# Patient Record
Sex: MALE | Race: WHITE | Employment: OTHER | URBAN - METROPOLITAN AREA
[De-identification: names, ages, dates, MRNs, and addresses within clinical notes are randomized per-mention and may not be internally consistent; named-entity substitution may affect disease eponyms.]

---

## 2018-04-01 ENCOUNTER — HOSPITAL ENCOUNTER (EMERGENCY)
Facility: CLINIC | Age: 83
Discharge: HOME | End: 2018-04-01
Attending: EMERGENCY MEDICINE | Admitting: EMERGENCY MEDICINE
Payer: MEDICARE

## 2018-04-01 ENCOUNTER — APPOINTMENT (OUTPATIENT)
Dept: CT IMAGING | Facility: CLINIC | Age: 83
End: 2018-04-01
Attending: PHYSICIAN ASSISTANT
Payer: MEDICARE

## 2018-04-01 ENCOUNTER — APPOINTMENT (OUTPATIENT)
Dept: GENERAL RADIOLOGY | Facility: CLINIC | Age: 83
End: 2018-04-01
Attending: PHYSICIAN ASSISTANT
Payer: MEDICARE

## 2018-04-01 VITALS
SYSTOLIC BLOOD PRESSURE: 181 MMHG | TEMPERATURE: 97.8 F | RESPIRATION RATE: 18 BRPM | DIASTOLIC BLOOD PRESSURE: 158 MMHG | OXYGEN SATURATION: 99 %

## 2018-04-01 DIAGNOSIS — M54.6 ACUTE MIDLINE THORACIC BACK PAIN: ICD-10-CM

## 2018-04-01 DIAGNOSIS — M53.3 PAIN IN THE COCCYX: ICD-10-CM

## 2018-04-01 DIAGNOSIS — S09.90XA CLOSED HEAD INJURY, INITIAL ENCOUNTER: ICD-10-CM

## 2018-04-01 DIAGNOSIS — R03.0 ELEVATED BLOOD PRESSURE READING WITHOUT DIAGNOSIS OF HYPERTENSION: ICD-10-CM

## 2018-04-01 PROCEDURE — 72125 CT NECK SPINE W/O DYE: CPT

## 2018-04-01 PROCEDURE — 99284 EMERGENCY DEPT VISIT MOD MDM: CPT | Mod: 25

## 2018-04-01 PROCEDURE — 70450 CT HEAD/BRAIN W/O DYE: CPT

## 2018-04-01 PROCEDURE — 72220 X-RAY EXAM SACRUM TAILBONE: CPT

## 2018-04-01 PROCEDURE — 25000132 ZZH RX MED GY IP 250 OP 250 PS 637: Performed by: PHYSICIAN ASSISTANT

## 2018-04-01 PROCEDURE — 72072 X-RAY EXAM THORAC SPINE 3VWS: CPT

## 2018-04-01 PROCEDURE — 72100 X-RAY EXAM L-S SPINE 2/3 VWS: CPT

## 2018-04-01 RX ORDER — ACETAMINOPHEN 325 MG/1
650 TABLET ORAL ONCE
Status: COMPLETED | OUTPATIENT
Start: 2018-04-01 | End: 2018-04-01

## 2018-04-01 RX ADMIN — ACETAMINOPHEN 650 MG: 325 TABLET, FILM COATED ORAL at 16:44

## 2018-04-01 ASSESSMENT — ENCOUNTER SYMPTOMS
BACK PAIN: 1
NECK PAIN: 1
WOUND: 0
VOMITING: 0

## 2018-04-01 NOTE — ED AVS SNAPSHOT
[facility] Emergency Department    [address]    [facility] [address]    Phone:  [phone]    Fax:  [phone]                                       Terry Whalen   MRN: [id number]    Department:  [facility] Emergency Department   Date of Visit:  4/1/2018           After Visit Summary Signature Page     I have received my discharge instructions, and my questions have been answered. I have discussed any challenges I see with this plan with the nurse or doctor.    ..........................................................................................................................................  Patient/Patient Representative Signature      ..........................................................................................................................................  Patient Representative Print Name and Relationship to Patient    ..................................................               ................................................  Date                                            Time    ..........................................................................................................................................  Reviewed by Signature/Title    ...................................................              ..............................................  Date                                                            Time

## 2018-04-01 NOTE — ED PROVIDER NOTES
History     Chief Complaint:  Fall and Back Pain    HPI   Terry Whalen is a 86 year old male who presents to the emergency department today via EMS for evaluation of a fall and back pain. The patient reports prior to arrival he was attempting to  his dog on the floor when he accidentally lost his balance and fell backwards hitting his coccyx and then his head on the wall and then landed on his low back. He was unable to get up secondary to pain and leg weakness, EMS was called and he presented to the ED for further evaluation. Upon EMS presentation, he was up and walking around and complained of neck pain therefore placed in a c-collar. However, patient notes his neck pain is secondary to hitting his head while ago.  He currently rates his pain 2/10. The patient denies loss of consciousness, vomiting, and he remembers the whole event. Of note, patient recently moved to [place] from [place] in September 2017.     Allergies:  No Known Drug Allergies     Medications:    Aspirin (two baby aspirin a day)  Acetaminophen 325mg (three times a day)    Past Medical History:    History reviewed. No pertinent past medical history.    Past Surgical History:    History reviewed. No pertinent past surgical history.    Family History:    History reviewed. No pertinent family history.     Social History:  The patient was accompanied to the ED by family.  Marital Status:      Review of Systems   Gastrointestinal: Negative for vomiting.   Musculoskeletal: Positive for back pain and neck pain.   Skin: Negative for wound.   Neurological: Negative for syncope.   All other systems reviewed and are negative.    Physical Exam   Vitals:  BP: 184/87  Heart Rate: 75  Temp: 97.8  F (36.6  C)  Resp: 18  SpO2: 98 %    Physical Exam  General: Alert and interactive. Appears well.   Eyes: The pupils are equal and round. EOMs intact. No scleral icterus. No erythema of the posterior oropharynx.      CV: Regular rate and rhythm.  S1 and S2 normal without murmur, click, gallop or rub.   Resp: Breath sounds are clear bilaterally, without rhonchi, wheezes, rales. Non-labored, no retractions or accessory muscle use.     GI: Abdomen is soft without distension. No tenderness to palpation. No peritoneal signs.    MS: Tenderness to palpation along the midline spine, especially over the cervical vertebra prominens. Patient is along tender along midline spine in thoracic and lumbar spine.   Skin:  Warm and dry. No rash or lesions noted.  Neuro: Alert and oriented x 3. GCS 15. No focal deficits noted. Good sensation and strength in upper and lower extremities.   Psych: Awake. Alert.  Normal affect. Appropriate interactions.  Lymph: No anterior or posterior cervical lymphadenopathy noted.    Emergency Department Course     Imaging:  Radiology findings were communicated with the patient who voiced understanding of the findings.  Thoracic Spine XR 3 Views   IMPRESSION: Multilevel degenerative change.  Report per radiology     Lumbar Spine XR 2-3 Views  IMPRESSION:   1. Multilevel degenerative changes.  2. No fractures are identified.  Report per radiology     XR Sacrum and Coccyx 2 Views  IMPRESSION: No definite sacral fracture. No definite coccygeal  fracture or dislocation.  Report per radiology     Head CT w/o Contrast  No bleed or fractures are identified.  Report per radiology     Cervical Spine CT w/o Contrast  1. Multilevel degenerative changes. Reversal of the cervical lordosis.  2. No fractures are identified.  Report per radiology     Interventions:  1644 Tylenol 650mg PO     Emergency Department Course:  Nursing notes and vitals reviewed.  The patient was sent for a multiple imaging studies while in the emergency department, results above.   1550: I performed an exam of the patient as documented above.   1735: Patient rechecked and updated.   Findings and plan explained to the Patient and family. Patient discharged home with instructions  regarding supportive care, medications, and reasons to return. The importance of close follow-up was reviewed.   I personally reviewed the imaging results with the Patient and family and answered all related questions prior to discharge.    Impression & Plan    Medical Decision Making: Terry Whalen is a 86 year old male who presents for evaluation of back pain after a fall. He has some symptoms of back pain after hitting his head after falling when trying to  his dog. He dies loss of consciousness, and he has no other symptoms that would suggest a head injury. On exam, the patient is neurologically intact. He has some midline spinal tenderness, most prominently in the low cervical and sacral spine. Patient has completely negative imaging workup, including negative CT of head and neck, and negative x-rays of the remainder of his spine. It is likely that he had a contusion of his spine. The patient only takes Tylenol for pain, as he has a poor reaction to Ibuprofen and refuses anything stronger for pain. At this point, I feel he is safe for discharge. He should follow up closely with his PCP. Additionally, the patient had elevated blood pressure today, and he may need to bring this up to his PCP. He may return to the Emergency Department for any worsening pain. He has no further questions nor concerns.     Diagnosis:    ICD-10-CM    1. Closed head injury, initial encounter S09.90XA    2. Acute midline thoracic back pain M54.6    3. Pain in the coccyx M53.3      Disposition:  Discharged to home    Discharge Medications:    Chloe RODRIGUEZ PA-C, interviewed the patient, explained the course of action and discussed the patient with Dr. Valera, who then evaluated the patient.    Scribe Disclosure:  I, Agustina Batista, am serving as a scribe at 3:42 PM on 4/1/2018 to document services personally performed by Chloe Valverde PA-C based on my observations and the provider's statements to me.     4/1/2018   [facility] EMERGENCY DEPARTMENT       Chloe Valverde PA-C  04/01/18 [id number]

## 2018-04-01 NOTE — ED NOTES
Bed: ED20  Expected date: 4/1/18  Expected time: 3:23 PM  Means of arrival: Ambulance  Comments:  Caro Falcon

## 2018-04-01 NOTE — ED AVS SNAPSHOT
[facility] Emergency Department    [address]    [facility] [address]    Phone:  [phone]    Fax:  [phone]                                       Terry Whalen   MRN: [id number]    Department:  [facility] Emergency Department   Date of Visit:  4/1/2018           Patient Information     Date Of Birth          2/23/1932        Your diagnoses for this visit were:     Closed head injury, initial encounter     Acute midline thoracic back pain        You were seen by Pascual Valera MD.      Follow-up Information     Follow up with [facility] In 2 days.    Why:  As needed    Contact information:    [address]  [facility] [address]  [phone]          Follow up with [facility] Emergency Department.    Specialty:  EMERGENCY MEDICINE    Why:  If symptoms worsen    Contact information:    [address]  [facility] [id number] [phone]        Discharge Instructions       Discharge Instructions  Head Injury    You have been seen today for a head injury. Your evaluation included a history and physical examination. You may have had a CT (CAT) scan performed, though most head injuries do not require a scan. Based on this evaluation, your provider today does not feel that your head injury is serious.    Generally, every Emergency Department visit should have a follow-up clinic visit with either a primary or a specialty clinic/provider. Please follow-up as instructed by your emergency provider today.  Return to the Emergency Department if:    You are confused or you are not acting right.    Your headache gets worse or you start to have a really bad headache even with your recommended treatment plan.    You vomit (throw up) more than once.    You have a seizure.    You have trouble walking.    You have weakness or paralysis (cannot move) in an arm or a leg.    You have blood or fluid coming from your ears or  nose.    You have new symptoms or anything that worries you.    Sleeping:  It is okay for you to sleep, but someone should wake you up if instructed by your provider, and someone should check on you at your usual time to wake up.     Activity:    Do not drive for at least 24 hours.    Do not drive if you have dizzy spells or trouble concentrating, or remembering things.    Do not return to any contact sports until cleared by your regular provider.     MORE INFORMATION:    Concussion:  A concussion is a minor head injury that may cause temporary problems with the way the brain works. Although concussions are important, they are generally not an emergency or a reason that a person needs to be hospitalized. Some concussion symptoms include confusion, amnesia (forgetful), nausea (sick to your stomach) and vomiting (throwing up), dizziness, fatigue, memory or concentration problems, irritability and sleep problems. For most people, concussions are mild and temporary but some will have more severe and persistent symptoms that require on-going care and treatment.  CT Scans: Your evaluation today may have included a CT scan (CAT scan) to look for things like bleeding or a skull fracture (broken bone).  CT scans involve radiation and too many CT scans can cause serious health problems like cancer, especially in children.  Because of this, your provider may not have ordered a CT scan today if they think you are at low risk for a serious or life threatening problem.    If you were given a prescription for medicine here today, be sure to read all of the information (including the package insert) that comes with your prescription.  This will include important information about the medicine, its side effects, and any warnings that you need to know about.  The pharmacist who fills the prescription can provide more information and answer questions you may have about the medicine.  If you have questions or concerns that the  pharmacist cannot address, please call or return to the Emergency Department.     Remember that you can always come back to the Emergency Department if you are not able to see your regular provider in the amount of time listed above, if you get any new symptoms, or if there is anything that worries you.  Discharge Instructions  Back Pain  You were seen today for back pain. Back pain can have many causes, but most will get better without surgery or other specific treatment. Sometimes there is a herniated ( slipped ) disc. We do not usually do MRI scans to look for these right away, since most herniated discs will get better on their own with time.  Today, we did not find any evidence that your back pain was caused by a serious condition. However, sometimes symptoms develop over time and cannot be found during an emergency visit, so it is very important that you follow up with your primary provider.  Generally, every Emergency Department visit should have a follow-up clinic visit with either a primary or a specialty clinic/provider. Please follow-up as instructed by your emergency provider today.    Return to the Emergency Department if:    You develop a fever with your back pain.     You have weakness or change in sensation in one or both legs.    You lose control of your bowels or bladder, or cannot empty your bladder (cannot pee).    Your pain gets much worse.     Follow-up with your provider:    Unless your pain has completely gone away, please make an appointment with your provider within one week. Most of the routine care for back pain is available in a clinic and not the Emergency Department. You may need further management of your back pain, such as more pain medication, imaging such as an X-ray or MRI, or physical therapy.    What can I do to help myself?    Remain Active -- People are often afraid that they will hurt their back further or delay recovery by remaining active, but this is one of the best things  you can do for your back. In fact, staying in bed for a long time to rest is not recommended. Studies have shown that people with low back pain recover faster when they remain active. Movement helps to bring blood flow to the muscles and relieve muscle spasms as well as preventing loss of muscle strength.    Heat -- Using a heating pad can help with low back pain during the first few weeks. Do not sleep with a heating pad, as you can be burned.     Pain medications - You may take a pain medication such as Tylenol  (acetaminophen), Advil , Motrin  (ibuprofen) or Aleve  (naproxen).  If you were given a prescription for medicine here today, be sure to read all of the information (including the package insert) that comes with your prescription.  This will include important information about the medicine, its side effects, and any warnings that you need to know about.  The pharmacist who fills the prescription can provide more information and answer questions you may have about the medicine.  If you have questions or concerns that the pharmacist cannot address, please call or return to the Emergency Department.   Remember that you can always come back to the Emergency Department if you are not able to see your regular provider in the amount of time listed above, if you get any new symptoms, or if there is anything that worries you.      24 Hour Appointment Hotline       To make an appointment at any [facility], call [phone]). If you don't have a family doctor or clinic, we will help you find one. [facility] are conveniently located to serve the needs of you and your family.             Review of your medicines      Our records show that you are taking the medicines listed below. If these are incorrect, please call your family doctor or clinic.        Dose / Directions Last dose taken    ASPIRIN PO        Take by mouth daily   Refills:  0        TYLENOL PO   Dose:  650 mg        Take 650  mg by mouth Takes three times daily   Refills:  0                Procedures and tests performed during your visit     Cervical spine CT w/o contrast    Head CT w/o contrast    Lumbar spine XR, 2-3 views    Thoracic spine XR, 3 views    XR Sacrum and Coccyx 2 Views      Orders Needing Specimen Collection     None      Pending Results     Date and Time Order Name Status Description    4/1/2018 1601 XR Sacrum and Coccyx 2 Views Preliminary     4/1/2018 1601 Lumbar spine XR, 2-3 views Preliminary     4/1/2018 1601 Thoracic spine XR, 3 views Preliminary     4/1/2018 1601 Cervical spine CT w/o contrast Preliminary     4/1/2018 1601 Head CT w/o contrast Preliminary             Pending Culture Results     No orders found from 3/30/2018 to 4/2/2018.            Pending Results Instructions     If you had any lab results that were not finalized at the time of your Discharge, you can call the ED Lab Result RN at [phone]. You will be contacted by this team for any positive Lab results or changes in treatment. The nurses are available 7 days a week from 10A to 6:30P.  You can leave a message 24 hours per day and they will return your call.        Test Results From Your Hospital Stay        4/1/2018  5:09 PM      Narrative     CT SCAN OF THE HEAD WITHOUT CONTRAST   4/1/2018 5:03 PM     HISTORY: Fall, hit head;     TECHNIQUE:  Axial images of the head and coronal reformations without  IV contrast material. Radiation dose for this scan was reduced using  automated exposure control, adjustment of the mA and/or kV according  to patient size, or iterative reconstruction technique.    COMPARISON: None.    FINDINGS:  There is generalized atrophy of the brain.  White matter  changes are present in the cerebral hemispheres that are consistent  with small vessel ischemic disease in this age patient. There is no  evidence of intracranial hemorrhage, mass, acute infarct or anomaly.  The visualized portions of the sinuses and mastoids  appear normal. No  intracranial hemorrhage or skull fractures.        Impression     IMPRESSION: No bleed or fractures are identified.           4/1/2018  5:08 PM      Narrative     CT CERVICAL SPINE WITHOUT CONTRAST   4/1/2018 5:04 PM     HISTORY: Fall, midline spinal pain, Fall, midline spinal pain;      TECHNIQUE: Axial images of the cervical spine were obtained without  intravenous contrast. Multiplanar reformations were performed.   Radiation dose for this scan was reduced using automated exposure  control, adjustment of the mA and/or kV according to patient size, or  iterative reconstruction technique.    COMPARISON: None.    FINDINGS: There is no evidence of fracture. There is severe reversal  of the cervical lordosis.  There is scoliosis of the cervical spine  convex towards the right.    Craniocervical junction: Normal.     C1-C2:  Calcified pannus is seen posterior to the odontoid. This can  be seen in patients with calcium pyrophosphate deposition disease.     C2-C3:  Mild annular disc bulge. Severe degenerative changes in the  left facet joint.     C3-C4:  Loss of disc space height. Mild annular bulge. Severe  degenerative changes in the facet joints.     C4-C5:  Loss of disc space height. Severe degenerative change in the  left facet joint.     C5-C6:  Mild annular disc bulge. Degenerative changes in the facet  joints.      C6-C7:  Loss of disc space height. Mild bulge.      C7-T1:   Loss of disc space height. Central canal and neural foramen  are patent.        Impression     IMPRESSION:    1. Multilevel degenerative changes. Reversal of the cervical lordosis.  2. No fractures are identified.         4/1/2018  5:33 PM      Narrative     THORACIC SPINE THREE VIEWS 4/1/2018 5:25 PM     HISTORY: Fall, midline spinal tenderness;     COMPARISON: None.    FINDINGS: There is scoliosis of the thoracic spine convex towards the  right and lumbar spine convex towards the left. Loss of disc space  height at  multiple levels. No acute fractures are identified..        Impression     IMPRESSION: Multilevel degenerative change.         4/1/2018  5:32 PM      Narrative     LUMBAR SPINE TWO-THREE  VIEWS  4/1/2018 5:15 PM     HISTORY: Fall, midline spinal pain;     COMPARISON: None.    FINDINGS: There is scoliosis of the lumbar spine convex towards the  left. There is loss of disc space height throughout the lumbar spine.  No fractures are identified..        Impression     IMPRESSION:   1. Multilevel degenerative changes.  2. No fractures are identified.         4/1/2018  5:32 PM      Narrative     SACRUM AND COCCYX TWO VIEWS  4/1/2018 5:20 PM    HISTORY: Fall, midline tenderness;     COMPARISON: None.        Impression     IMPRESSION: No definite sacral fracture. No definite coccygeal  fracture or dislocation.                Clinical Quality Measure: Blood Pressure Screening     Your blood pressure was checked while you were in the emergency department today. The last reading we obtained was  BP: (!) 181/158 . Please read the guidelines below about what these numbers mean and what you should do about them.  If your systolic blood pressure (the top number) is less than 120 and your diastolic blood pressure (the bottom number) is less than 80, then your blood pressure is normal. There is nothing more that you need to do about it.  If your systolic blood pressure (the top number) is 120-139 or your diastolic blood pressure (the bottom number) is 80-89, your blood pressure may be higher than it should be. You should have your blood pressure rechecked within a year by a primary care provider.  If your systolic blood pressure (the top number) is 140 or greater or your diastolic blood pressure (the bottom number) is 90 or greater, you may have high blood pressure. High blood pressure is treatable, but if left untreated over time it can put you at risk for heart attack, stroke, or kidney failure. You should have your blood pressure  "rechecked by a primary care provider within the next 4 weeks.  If your provider in the emergency department today gave you specific instructions to follow-up with your doctor or provider even sooner than that, you should follow that instruction and not wait for up to 4 weeks for your follow-up visit.        Thank you for choosing [facility]       Thank you for choosing [facility] for your care. Our goal is always to provide you with excellent care. Hearing back from our patients is one way we can continue to improve our services. Please take a few minutes to complete the written survey that you may receive in the mail after you visit with us. Thank you!        [facility] Information     [employer] lets you send messages to your doctor, view your test results, renew your prescriptions, schedule appointments and more. To sign up, go to [email or web address] . Click on \"Log in\" on the left side of the screen, which will take you to the Welcome page. Then click on \"Sign up Now\" on the right side of the page.     You will be asked to enter the access code listed below, as well as some personal information. Please follow the directions to create your username and password.     Your access code is: [email or web address]  Expires: 2018  5:36 PM     Your access code will  in 90 days. If you need help or a new code, please call your [facility] clinic or [phone].        Care EveryWhere ID     This is your Care EveryWhere ID. This could be used by other organizations to access your [facility] medical records  [id number]        Equal Access to Services     CARLI MAGAÑA : Hadii shannen ku hadasho Soomaali, waaxda luqadaha, qaybta kaalmada adeegyada, tania frias . So [facility] [phone].    ATENCIÓN: Si habla español, tiene a jane disposición servicios gratuitos de asistencia lingüística. Llame al [phone].    We comply with applicable federal civil rights laws and [place] laws. We do not discriminate on " the basis of race, color, national origin, age, disability, sex, sexual orientation, or gender identity.            After Visit Summary       This is your record. Keep this with you and show to your community pharmacist(s) and doctor(s) at your next visit.

## 2018-04-01 NOTE — ED PROVIDER NOTES
Emergency Department Attending Supervision Note  4/1/2018  5:42 PM      I evaluated this patient in conjunction with Chloe Valverde PA-C      Briefly, the patient is an 86 year old male who is here after a ground level fall in which he did bump his head on a wall and landed on his back side. There is no loss of consciousness. He is complaining of some mild neck and back discomfort. He is not anticoagulated. There is no external sign of trauma and he had negative imaging including CT head, c-spine, and T and L spine. He is being discharged with reassurance for minor back strain and close head injury.     General: Patient is alert and cooperative.  HENT:  No external sign of head trauma   Eyes: EOMI. Normal conjunctiva.  Neck:  Normal range of motion and appearance.   Cardiovascular:  Normal rate, regular rhythm.  Pulmonary/Chest:  Effort normal. No wheezing or crackles.  Abdominal: Soft. No distension or tenderness.     Musculoskeletal: Normal range of motion. No edema or tenderness.   Neurological: oriented, normal strength, sensation, and coordination.   Skin: Warm and dry. No rash or bruising.   Psychiatric: Normal mood and affect. Normal behavior and judgement.        Diagnosis    ICD-10-CM    1. Closed head injury, initial encounter S09.90XA    2. Acute midline thoracic back pain M54.6    3. Pain in the coccyx M53.3          Pascual Valera MD Isaacson, Brian A, MD  04/05/18 1641       Pascual Valera MD  04/05/18 1641

## 2018-04-01 NOTE — ED NOTES
Pt states he was attempting to  his dog from the floor and fell back. Injury to coccyx, and hit back of head. Denies LOC. C-collar on, rates pain 2/10.

## 2018-04-01 NOTE — DISCHARGE INSTRUCTIONS
Discharge Instructions  Head Injury    You have been seen today for a head injury. Your evaluation included a history and physical examination. You may have had a CT (CAT) scan performed, though most head injuries do not require a scan. Based on this evaluation, your provider today does not feel that your head injury is serious.    Generally, every Emergency Department visit should have a follow-up clinic visit with either a primary or a specialty clinic/provider. Please follow-up as instructed by your emergency provider today.  Return to the Emergency Department if:    You are confused or you are not acting right.    Your headache gets worse or you start to have a really bad headache even with your recommended treatment plan.    You vomit (throw up) more than once.    You have a seizure.    You have trouble walking.    You have weakness or paralysis (cannot move) in an arm or a leg.    You have blood or fluid coming from your ears or nose.    You have new symptoms or anything that worries you.    Sleeping:  It is okay for you to sleep, but someone should wake you up if instructed by your provider, and someone should check on you at your usual time to wake up.     Activity:    Do not drive for at least 24 hours.    Do not drive if you have dizzy spells or trouble concentrating, or remembering things.    Do not return to any contact sports until cleared by your regular provider.     MORE INFORMATION:    Concussion:  A concussion is a minor head injury that may cause temporary problems with the way the brain works. Although concussions are important, they are generally not an emergency or a reason that a person needs to be hospitalized. Some concussion symptoms include confusion, amnesia (forgetful), nausea (sick to your stomach) and vomiting (throwing up), dizziness, fatigue, memory or concentration problems, irritability and sleep problems. For most people, concussions are mild and temporary but some will have more  severe and persistent symptoms that require on-going care and treatment.  CT Scans: Your evaluation today may have included a CT scan (CAT scan) to look for things like bleeding or a skull fracture (broken bone).  CT scans involve radiation and too many CT scans can cause serious health problems like cancer, especially in children.  Because of this, your provider may not have ordered a CT scan today if they think you are at low risk for a serious or life threatening problem.    If you were given a prescription for medicine here today, be sure to read all of the information (including the package insert) that comes with your prescription.  This will include important information about the medicine, its side effects, and any warnings that you need to know about.  The pharmacist who fills the prescription can provide more information and answer questions you may have about the medicine.  If you have questions or concerns that the pharmacist cannot address, please call or return to the Emergency Department.     Remember that you can always come back to the Emergency Department if you are not able to see your regular provider in the amount of time listed above, if you get any new symptoms, or if there is anything that worries you.  Discharge Instructions  Back Pain  You were seen today for back pain. Back pain can have many causes, but most will get better without surgery or other specific treatment. Sometimes there is a herniated ( slipped ) disc. We do not usually do MRI scans to look for these right away, since most herniated discs will get better on their own with time.  Today, we did not find any evidence that your back pain was caused by a serious condition. However, sometimes symptoms develop over time and cannot be found during an emergency visit, so it is very important that you follow up with your primary provider.  Generally, every Emergency Department visit should have a follow-up clinic visit with either a  primary or a specialty clinic/provider. Please follow-up as instructed by your emergency provider today.    Return to the Emergency Department if:    You develop a fever with your back pain.     You have weakness or change in sensation in one or both legs.    You lose control of your bowels or bladder, or cannot empty your bladder (cannot pee).    Your pain gets much worse.     Follow-up with your provider:    Unless your pain has completely gone away, please make an appointment with your provider within one week. Most of the routine care for back pain is available in a clinic and not the Emergency Department. You may need further management of your back pain, such as more pain medication, imaging such as an X-ray or MRI, or physical therapy.    What can I do to help myself?    Remain Active -- People are often afraid that they will hurt their back further or delay recovery by remaining active, but this is one of the best things you can do for your back. In fact, staying in bed for a long time to rest is not recommended. Studies have shown that people with low back pain recover faster when they remain active. Movement helps to bring blood flow to the muscles and relieve muscle spasms as well as preventing loss of muscle strength.    Heat -- Using a heating pad can help with low back pain during the first few weeks. Do not sleep with a heating pad, as you can be burned.     Pain medications - You may take a pain medication such as Tylenol  (acetaminophen), Advil , Motrin  (ibuprofen) or Aleve  (naproxen).  If you were given a prescription for medicine here today, be sure to read all of the information (including the package insert) that comes with your prescription.  This will include important information about the medicine, its side effects, and any warnings that you need to know about.  The pharmacist who fills the prescription can provide more information and answer questions you may have about the medicine.  If  you have questions or concerns that the pharmacist cannot address, please call or return to the Emergency Department.   Remember that you can always come back to the Emergency Department if you are not able to see your regular provider in the amount of time listed above, if you get any new symptoms, or if there is anything that worries you.

## 2018-04-24 ENCOUNTER — HOSPITAL ENCOUNTER (EMERGENCY)
Facility: CLINIC | Age: 83
Discharge: HOME | End: 2018-04-24
Attending: EMERGENCY MEDICINE | Admitting: EMERGENCY MEDICINE
Payer: MEDICARE

## 2018-04-24 ENCOUNTER — APPOINTMENT (OUTPATIENT)
Dept: CT IMAGING | Facility: CLINIC | Age: 83
End: 2018-04-24
Attending: EMERGENCY MEDICINE
Payer: MEDICARE

## 2018-04-24 VITALS
SYSTOLIC BLOOD PRESSURE: 165 MMHG | RESPIRATION RATE: 17 BRPM | HEART RATE: 70 BPM | DIASTOLIC BLOOD PRESSURE: 91 MMHG | OXYGEN SATURATION: 94 % | TEMPERATURE: 98.3 F | WEIGHT: 147 LBS

## 2018-04-24 DIAGNOSIS — K57.32 DIVERTICULITIS OF COLON: ICD-10-CM

## 2018-04-24 LAB
ALBUMIN SERPL-MCNC: 3.9 G/DL (ref 3.4–5)
ALBUMIN UR-MCNC: NEGATIVE MG/DL
ALP SERPL-CCNC: 90 U/L (ref 40–150)
ALT SERPL W P-5'-P-CCNC: 18 U/L (ref 0–70)
ANION GAP SERPL CALCULATED.3IONS-SCNC: 6 MMOL/L (ref 3–14)
APPEARANCE UR: ABNORMAL
AST SERPL W P-5'-P-CCNC: 20 U/L (ref 0–45)
BASOPHILS # BLD AUTO: 0.1 10E9/L (ref 0–0.2)
BASOPHILS NFR BLD AUTO: 0.7 %
BILIRUB SERPL-MCNC: 0.6 MG/DL (ref 0.2–1.3)
BILIRUB UR QL STRIP: NEGATIVE
BUN SERPL-MCNC: 20 MG/DL (ref 7–30)
CALCIUM SERPL-MCNC: 8.9 MG/DL (ref 8.5–10.1)
CHLORIDE SERPL-SCNC: 109 MMOL/L (ref 94–109)
CO2 SERPL-SCNC: 26 MMOL/L (ref 20–32)
COLOR UR AUTO: YELLOW
CREAT SERPL-MCNC: 0.9 MG/DL (ref 0.66–1.25)
DIFFERENTIAL METHOD BLD: ABNORMAL
EOSINOPHIL # BLD AUTO: 0.1 10E9/L (ref 0–0.7)
EOSINOPHIL NFR BLD AUTO: 1.2 %
ERYTHROCYTE [DISTWIDTH] IN BLOOD BY AUTOMATED COUNT: 13 % (ref 10–15)
GFR SERPL CREATININE-BSD FRML MDRD: 80 ML/MIN/1.7M2
GLUCOSE SERPL-MCNC: 92 MG/DL (ref 70–99)
GLUCOSE UR STRIP-MCNC: NEGATIVE MG/DL
HCT VFR BLD AUTO: 41.4 % (ref 40–53)
HGB BLD-MCNC: 13.6 G/DL (ref 13.3–17.7)
HGB UR QL STRIP: NEGATIVE
IMM GRANULOCYTES # BLD: 0 10E9/L (ref 0–0.4)
IMM GRANULOCYTES NFR BLD: 0.4 %
KETONES UR STRIP-MCNC: NEGATIVE MG/DL
LEUKOCYTE ESTERASE UR QL STRIP: NEGATIVE
LIPASE SERPL-CCNC: 89 U/L (ref 73–393)
LYMPHOCYTES # BLD AUTO: 1 10E9/L (ref 0.8–5.3)
LYMPHOCYTES NFR BLD AUTO: 10.4 %
MCH RBC QN AUTO: 33.4 PG (ref 26.5–33)
MCHC RBC AUTO-ENTMCNC: 32.9 G/DL (ref 31.5–36.5)
MCV RBC AUTO: 102 FL (ref 78–100)
MONOCYTES # BLD AUTO: 0.8 10E9/L (ref 0–1.3)
MONOCYTES NFR BLD AUTO: 8.7 %
MUCOUS THREADS #/AREA URNS LPF: PRESENT /LPF
NEUTROPHILS # BLD AUTO: 7.2 10E9/L (ref 1.6–8.3)
NEUTROPHILS NFR BLD AUTO: 78.6 %
NITRATE UR QL: NEGATIVE
NRBC # BLD AUTO: 0 10*3/UL
NRBC BLD AUTO-RTO: 0 /100
PH UR STRIP: 5 PH (ref 5–7)
PLATELET # BLD AUTO: 236 10E9/L (ref 150–450)
POTASSIUM SERPL-SCNC: 3.9 MMOL/L (ref 3.4–5.3)
PROT SERPL-MCNC: 7.2 G/DL (ref 6.8–8.8)
RBC # BLD AUTO: 4.07 10E12/L (ref 4.4–5.9)
RBC #/AREA URNS AUTO: 2 /HPF (ref 0–2)
SODIUM SERPL-SCNC: 141 MMOL/L (ref 133–144)
SOURCE: ABNORMAL
SP GR UR STRIP: 1.03 (ref 1–1.03)
SQUAMOUS #/AREA URNS AUTO: <1 /HPF (ref 0–1)
UROBILINOGEN UR STRIP-MCNC: 2 MG/DL (ref 0–2)
WBC # BLD AUTO: 9.2 10E9/L (ref 4–11)
WBC #/AREA URNS AUTO: 3 /HPF (ref 0–5)

## 2018-04-24 PROCEDURE — 99285 EMERGENCY DEPT VISIT HI MDM: CPT | Mod: 25

## 2018-04-24 PROCEDURE — 74177 CT ABD & PELVIS W/CONTRAST: CPT

## 2018-04-24 PROCEDURE — 83690 ASSAY OF LIPASE: CPT | Performed by: EMERGENCY MEDICINE

## 2018-04-24 PROCEDURE — 81001 URINALYSIS AUTO W/SCOPE: CPT | Performed by: EMERGENCY MEDICINE

## 2018-04-24 PROCEDURE — 25000128 H RX IP 250 OP 636: Performed by: EMERGENCY MEDICINE

## 2018-04-24 PROCEDURE — 85025 COMPLETE CBC W/AUTO DIFF WBC: CPT | Performed by: EMERGENCY MEDICINE

## 2018-04-24 PROCEDURE — 80053 COMPREHEN METABOLIC PANEL: CPT | Performed by: EMERGENCY MEDICINE

## 2018-04-24 RX ORDER — METRONIDAZOLE 500 MG/1
500 TABLET ORAL 4 TIMES DAILY
Qty: 28 TABLET | Refills: 0 | Status: SHIPPED | OUTPATIENT
Start: 2018-04-24 | End: 2018-05-01

## 2018-04-24 RX ORDER — CIPROFLOXACIN 500 MG/1
500 TABLET, FILM COATED ORAL 2 TIMES DAILY
Qty: 14 TABLET | Refills: 0 | Status: SHIPPED | OUTPATIENT
Start: 2018-04-24

## 2018-04-24 RX ORDER — IOPAMIDOL 755 MG/ML
500 INJECTION, SOLUTION INTRAVASCULAR ONCE
Status: COMPLETED | OUTPATIENT
Start: 2018-04-24 | End: 2018-04-24

## 2018-04-24 RX ADMIN — IOPAMIDOL 74 ML: 755 INJECTION, SOLUTION INTRAVENOUS at 04:48

## 2018-04-24 RX ADMIN — SODIUM CHLORIDE 37 ML: 9 INJECTION, SOLUTION INTRAVENOUS at 04:48

## 2018-04-24 ASSESSMENT — ENCOUNTER SYMPTOMS
FEVER: 0
VOMITING: 0
ABDOMINAL PAIN: 1
NAUSEA: 0

## 2018-04-24 NOTE — ED AVS SNAPSHOT
[facility] Emergency Department    [address]    [facility] [address]    Phone:  [phone]    Fax:  [phone]                                       Terry Whalen   MRN: [id number]    Department:  [facility] Emergency Department   Date of Visit:  4/24/2018           After Visit Summary Signature Page     I have received my discharge instructions, and my questions have been answered. I have discussed any challenges I see with this plan with the nurse or doctor.    ..........................................................................................................................................  Patient/Patient Representative Signature      ..........................................................................................................................................  Patient Representative Print Name and Relationship to Patient    ..................................................               ................................................  Date                                            Time    ..........................................................................................................................................  Reviewed by Signature/Title    ...................................................              ..............................................  Date                                                            Time

## 2018-04-24 NOTE — ED AVS SNAPSHOT
[facility] Emergency Department    [address] [place] [address]    Phone:  [phone]    Fax:  [phone]                                       Terry Whalen   MRN: [id number]    Department:  [facility] Emergency Department   Date of Visit:  4/24/2018           Patient Information     Date Of Birth          2/23/1932        Your diagnoses for this visit were:     Diverticulitis of colon        You were seen by Tasha Khan MD.      Follow-up Information     Follow up with Maru Germain In 2 days.    Specialty:  Family Practice    Contact information:    [facility]  [address]  [facility] [address]  [phone]          Discharge Instructions       Discharge Instructions  Diverticulitis  Your provider has diagnosed you with diverticulitis.  Diverticulitis is an infection of a diverticulum, which is a tiny sack-like structure that protrudes off the wall of the colon (large intestine).  These sacks are created over years of increased pressure in the colon (this is diverticulosis), usually as a result of a diet without enough fruits, vegetables, and whole grains. Because these sacks are small, bacteria can get trapped inside them and cause an infection (this is divericulitis).  This infection often causes abdominal (belly) pain, fever, nausea (sick to stomach), and vomiting (throwing up). Diverticulitis is usually treated at home.  However, sometimes diverticulitis needs treatment in the hospital and may even need surgery.    Generally, every Emergency Department visit should have a follow-up clinic visit with either a primary or a specialty clinic/provider. Please follow-up as instructed by your emergency provider today.    Return to the Emergency Department if:     You get an oral temperature above 102oF or as directed by your provider.    You have blood in your stools (bright red or black, tarry stools), or have blood in your vomit.    You keep  "vomiting or cannot drink liquids or cannot keep your medicine down.    You cannot have a bowel movement or you cannot pass gas.    Your stomach gets bloated or bigger.    You faint or become very weak.    Your pain is too bad to tolerate.    You have new symptoms or anything that worries you.    What can I do to help myself?    Fill any antibiotic prescriptions the provider gave you and take them right away. Be sure to finish the whole antibiotic prescription.    For the first day or two at home drink only clear liquids.  This lets your intestines rest.    If your pain has improved after one or two days, you may start eating mild foods. Soda crackers, toast, plain noodles, gelatin, applesauce and bananas are good first choices.  Avoid foods that have acid, are spicy, fatty or fibrous (such as meats, coarse grains, vegetables). You may start eating these foods again in about 3-4 days when you are better.    Once you are back to normal, eat a high fiber diet of fruits, vegetables and whole grains. Some people think you should avoid eating nuts, seeds, and corn, but there is no definite proof this makes any difference in whether you will get diverticulitis again.     Pain and fever can be treated with Tylenol  (acetaminophen) or you might have been prescribed a pain medication.    Probiotics: If you have been given an antibiotic, you may want to also take a probiotic pill or eat yogurt with live cultures. Probiotics have \"good bacteria\" to help your intestines stay healthy. Studies have shown that probiotics help prevent diarrhea and other intestine problems (including C. diff infection) when you take antibiotics. You can buy these without a prescription in the pharmacy section of the store.  If you were given a prescription for medicine here today, be sure to read all of the information (including the package insert) that comes with your prescription.  This will include important information about the medicine, its " side effects, and any warnings that you need to know about.  The pharmacist who fills the prescription can provide more information and answer questions you may have about the medicine.  If you have questions or concerns that the pharmacist cannot address, please call or return to the Emergency Department.     Remember that you can always come back to the Emergency Department if you are not able to see your regular provider in the amount of time listed above, if you get any new symptoms, or if there is anything that worries you.      24 Hour Appointment Hotline       To make an appointment at any [facility] call [phone] ([phone]). If you don't have a family doctor or clinic, we will help you find one. [facility] clinics are conveniently located to serve the needs of you and your family.             Review of your medicines      START taking        Dose / Directions Last dose taken    ciprofloxacin 500 MG tablet   Commonly known as:  CIPRO   Dose:  500 mg   Quantity:  14 tablet        Take 1 tablet (500 mg) by mouth 2 times daily   Refills:  0        metroNIDAZOLE 500 MG tablet   Commonly known as:  FLAGYL   Dose:  500 mg   Quantity:  28 tablet        Take 1 tablet (500 mg) by mouth 4 times daily for 7 days   Refills:  0          Our records show that you are taking the medicines listed below. If these are incorrect, please call your family doctor or clinic.        Dose / Directions Last dose taken    ASPIRIN PO        Take by mouth daily   Refills:  0        TYLENOL PO   Dose:  650 mg        Take 650 mg by mouth Takes three times daily   Refills:  0                Prescriptions were sent or printed at these locations (2 Prescriptions)                   Other Prescriptions                Printed at Department/Unit printer (2 of 2)         ciprofloxacin (CIPRO) 500 MG tablet               metroNIDAZOLE (FLAGYL) 500 MG tablet                Procedures and tests performed during your visit     CBC with  platelets differential    CT Abdomen Pelvis w Contrast    Comprehensive metabolic panel    Lipase    UA with Microscopic      Orders Needing Specimen Collection     None      Pending Results     No orders found from 4/22/2018 to 4/25/2018.            Pending Culture Results     No orders found from 4/22/2018 to 4/25/2018.            Pending Results Instructions     If you had any lab results that were not finalized at the time of your Discharge, you can call the ED Lab Result RN at [phone]. You will be contacted by this team for any positive Lab results or changes in treatment. The nurses are available 7 days a week from 10A to 6:30P.  You can leave a message 24 hours per day and they will return your call.        Test Results From Your Hospital Stay        4/24/2018  4:05 AM      Component Results     Component Value Ref Range & Units Status    WBC 9.2 4.0 - 11.0 10e9/L Final    RBC Count 4.07 (L) 4.4 - 5.9 10e12/L Final    Hemoglobin 13.6 13.3 - 17.7 g/dL Final    Hematocrit 41.4 40.0 - 53.0 % Final     (H) 78 - 100 fl Final    MCH 33.4 (H) 26.5 - 33.0 pg Final    MCHC 32.9 31.5 - 36.5 g/dL Final    RDW 13.0 10.0 - 15.0 % Final    Platelet Count 236 150 - 450 10e9/L Final    Diff Method Automated Method  Final    % Neutrophils 78.6 % Final    % Lymphocytes 10.4 % Final    % Monocytes 8.7 % Final    % Eosinophils 1.2 % Final    % Basophils 0.7 % Final    % Immature Granulocytes 0.4 % Final    Nucleated RBCs 0 0 /100 Final    Absolute Neutrophil 7.2 1.6 - 8.3 10e9/L Final    Absolute Lymphocytes 1.0 0.8 - 5.3 10e9/L Final    Absolute Monocytes 0.8 0.0 - 1.3 10e9/L Final    Absolute Eosinophils 0.1 0.0 - 0.7 10e9/L Final    Absolute Basophils 0.1 0.0 - 0.2 10e9/L Final    Abs Immature Granulocytes 0.0 0 - 0.4 10e9/L Final    Absolute Nucleated RBC 0.0  Final         4/24/2018  4:04 AM      Component Results     Component Value Ref Range & Units Status    Sodium 141 133 - 144 mmol/L Final    Potassium 3.9  3.4 - 5.3 mmol/L Final    Chloride 109 94 - 109 mmol/L Final    Carbon Dioxide 26 20 - 32 mmol/L Final    Anion Gap 6 3 - 14 mmol/L Final    Glucose 92 70 - 99 mg/dL Final    Urea Nitrogen 20 7 - 30 mg/dL Final    Creatinine 0.90 0.66 - 1.25 mg/dL Final    GFR Estimate 80 >60 mL/min/1.7m2 Final    Non  GFR Calc    GFR Estimate If Black >90 >60 mL/min/1.7m2 Final    African American GFR Calc    Calcium 8.9 8.5 - 10.1 mg/dL Final    Bilirubin Total 0.6 0.2 - 1.3 mg/dL Final    Albumin 3.9 3.4 - 5.0 g/dL Final    Protein Total 7.2 6.8 - 8.8 g/dL Final    Alkaline Phosphatase 90 40 - 150 U/L Final    ALT 18 0 - 70 U/L Final    AST 20 0 - 45 U/L Final         4/24/2018  4:04 AM      Component Results     Component Value Ref Range & Units Status    Lipase 89 73 - 393 U/L Final         4/24/2018  4:14 AM      Component Results     Component Value Ref Range & Units Status    Color Urine Yellow  Final    Appearance Urine Slightly Cloudy  Final    Glucose Urine Negative NEG^Negative mg/dL Final    Bilirubin Urine Negative NEG^Negative Final    Ketones Urine Negative NEG^Negative mg/dL Final    Specific Gravity Urine 1.028 1.003 - 1.035 Final    Blood Urine Negative NEG^Negative Final    pH Urine 5.0 5.0 - 7.0 pH Final    Protein Albumin Urine Negative NEG^Negative mg/dL Final    Urobilinogen mg/dL 2.0 0.0 - 2.0 mg/dL Final    Nitrite Urine Negative NEG^Negative Final    Leukocyte Esterase Urine Negative NEG^Negative Final    Source Unspecified Urine  Final    WBC Urine 3 0 - 5 /HPF Final    RBC Urine 2 0 - 2 /HPF Final    Squamous Epithelial /HPF Urine <1 0 - 1 /HPF Final    Mucous Urine Present (A) NEG^Negative /LPF Final         4/24/2018  5:32 AM      Narrative     CT ABDOMEN PELVIS W CONTRAST  4/24/2018 4:51 AM     HISTORY: Left lower quadrant abdominal pain.    TECHNIQUE: Volumetric acquisition through abdomen and pelvis with IV  contrast. 74 mL Isovue-370. Radiation dose for this scan was reduced  using  automated exposure control, adjustment of the mA and/or kV  according to patient size, or iterative reconstruction technique.    COMPARISON: None.    FINDINGS: The liver, gallbladder, spleen, pancreas, adrenal glands and  kidneys demonstrate no worrisome findings. Low attenuation  subcentimeter liver lesion(s) compatible with benign cysts or other  benign lesions. No specific evaluation or follow-up is recommended in  a low risk patient. Bilateral renal cysts. No hydronephrosis.  Calcified pleural plaques in the lower chest bilaterally compatible  with asbestos exposure.    Colonic diverticulosis. There is mild localized wall thickening with  surrounding fat stranding/inflammation involving the left colon at the  descending/sigmoid colon junction compatible with diverticulitis. No  abscess or bowel obstruction. No free air. Moderate vascular  calcification. 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 Left common iliac artery is  slightly ectatic measuring 2.1 cm. Pelvic artifact from left hip  prostheses. Degenerative changes and scoliosis convex to the left in  the lumbar spine.        Impression     IMPRESSION:  1. Diverticulitis at the junction of the descending and sigmoid colon.  2. No other acute findings.    SYLVIA GAR MD                Clinical Quality Measure: Blood Pressure Screening     Your blood pressure was checked while you were in the emergency department today. The last reading we obtained was  BP: 154/77 . Please read the guidelines below about what these numbers mean and what you should do about them.  If your systolic blood pressure (the top number) is less than 120 and your diastolic blood pressure (the bottom number) is less than 80, then your blood pressure is normal. There is nothing more that you need to do about it.  If your systolic blood pressure (the top number) is 120-139 or your diastolic blood pressure (the bottom number) is 80-89,  "your blood pressure may be higher than it should be. You should have your blood pressure rechecked within a year by a primary care provider.  If your systolic blood pressure (the top number) is 140 or greater or your diastolic blood pressure (the bottom number) is 90 or greater, you may have high blood pressure. High blood pressure is treatable, but if left untreated over time it can put you at risk for heart attack, stroke, or kidney failure. You should have your blood pressure rechecked by a primary care provider within the next 4 weeks.  If your provider in the emergency department today gave you specific instructions to follow-up with your doctor or provider even sooner than that, you should follow that instruction and not wait for up to 4 weeks for your follow-up visit.        Thank you for choosing [facility]       Thank you for choosing [facility] for your care. Our goal is always to provide you with excellent care. Hearing back from our patients is one way we can continue to improve our services. Please take a few minutes to complete the written survey that you may receive in the mail after you visit with us. Thank you!        [facility] Information     [facility] lets you send messages to your doctor, view your test results, renew your prescriptions, schedule appointments and more. To sign up, go to [email or web address] . Click on \"Log in\" on the left side of the screen, which will take you to the Welcome page. Then click on \"Sign up Now\" on the right side of the page.     You will be asked to enter the access code listed below, as well as some personal information. Please follow the directions to create your username and password.     Your access code is: [email or web address]  Expires: 2018  5:36 PM     Your access code will  in 90 days. If you need help or a new code, please call your [facility] clinic or [phone].        Care EveryWhere ID     This is your Care EveryWhere ID. This could be used by other " organizations to access your [facility] medical records  [id number]        Equal Access to Services     CARLI MAGAÑA : Demarcus Kothari, zane gupta, tania guzman. So [facility] [phone].    ATENCIÓN: Si habla español, tiene a jane disposición servicios gratuitos de asistencia lingüística. Llame al [phone].    We comply with applicable federal civil rights laws and [place] laws. We do not discriminate on the basis of race, color, national origin, age, disability, sex, sexual orientation, or gender identity.            After Visit Summary       This is your record. Keep this with you and show to your community pharmacist(s) and doctor(s) at your next visit.

## 2018-04-24 NOTE — ED PROVIDER NOTES
History     Chief Complaint:  Abdominal Pain    The history is provided by the patient.      Terry Whalen is a 86 year old male with history of diverticular disease and hypertension who presents with abdominal pain. The patient woke up this evening with pain in his abdomen, worse in his LLQ. His pain kept him awake so he decided to call EMS. Here in the ED the patient reports that he has history of diverticular disease, last time this past February, but his current pain does not feel quite similar to that.  He reports he had a issue with bleeding.  He has not had any blood in his stools or black stools.  He took 2 acetaminophen and a baby aspirin prior to his arrival in the ED. He denies any fever, nausea, or vomiting, and he has no other medical concerns.  He does not want any pain medication.    Allergies:  No known drug allergies      Medications:    Aspirin    Past Medical History:    HTN  Diverticular disease    Past Surgical History:    History reviewed. No pertinent surgical history.     Family History:    History reviewed. No pertinent family history.      Social History:  Presents via EMS   Tobacco use: Former smoker  Alcohol use: No  PCP: Maru Germain    Marital Status:     Review of Systems   Constitutional: Negative for fever.   Gastrointestinal: Positive for abdominal pain. Negative for nausea and vomiting.   All other systems reviewed and are negative.    Physical Exam     Patient Vitals for the past 24 hrs:   BP Temp Temp src Pulse Resp SpO2 Weight   04/24/18 0515 - - - - - 98 % -   04/24/18 0500 154/77 - - - - 94 % -   04/24/18 0330 - - - - - 95 % -   04/24/18 0329 168/85 98.3  F (36.8  C) Oral 70 17 97 % 66.7 kg (147 lb)      Physical Exam  General: Patient is alert and interactive when I enter the room, well appearing and pleasant   Head:  The scalp, face, and head appear normal  Eyes:  Conjunctivae are normal  ENT:    The nose is normal    Pinnae are normal    External acoustic canals are  normal  Neck:  Trachea midline  CV:  Pulses are normal  Resp:  No respiratory distress   Abdomen:      Soft, moderate LLQ with guarding present, non-distended  Musc:  Normal muscular tone    No major joint effusions  Skin:  No rash or lesions noted  Neuro:  Speech is normal and fluent. Face is symmetric.     Moving all extremities well.   Psych: Awake. Alert.  Normal affect.  Appropriate interactions.    Emergency Department Course   Imaging:  Radiographic findings were communicated with the patient who voiced understanding of the findings.  CT Abdomen Pelvis w Contrast  IMPRESSION:  1. Diverticulitis at the junction of the descending and sigmoid colon.  2. No other acute findings.    SYLVIA GAR MD    Imaging independently reviewed and agree with radiologist interpretation.     Laboratory:  CBC: WNL (WBC 9.2, HGB 13.6, )    CMP: WNL (Creatinine 0.90)   Lipase: 89      UA: Mucous Urine Present, o/w Negative     Emergency Department Course:  Past medical records, nursing notes, and vitals reviewed.  0324: I performed an exam of the patient and obtained history, as documented above.      0525: I rechecked the patient. Findings and plan explained to the Patient. Patient discharged home with instructions regarding supportive care, medications, and reasons to return. The importance of close follow-up was reviewed.      I personally reviewed the laboratory results with the patient and answered all related questions prior to discharge.   Impression & Plan    Medical Decision Making:  The patient presented with abdominal pain and CT confirms diverticulitis without abscess or perforation; this appears consistent with the history and physical exam so I doubt another underlying etiology is also present.  Vision appears quite well despite the abdominal pain.  He does not want any pain medication and will only take Tylenol and aspirin.  He does have a history of diverticular bleeding however this is not the case  currently.  I offered patient admission or going home.  He would prefer to go home with antibiotics.  This represents uncomplicated diverticulitis at this time.  There are no signs of sepsis, shock or bacteremia.  The natural history of this problem was discussed, and I educated the patient regarding the symptoms and signs that should prompt return to the Emergency Department.  This would include worsening fevers, chills, vomiting, and more intense pain.  The patient is to take antibiotics as directed.    Follow-up with primary care physician is indicated in 1-2 days.        Diagnosis:    ICD-10-CM    1. Diverticulitis of colon K57.32        Disposition:  Discharged to home with plan as outlined.    Discharge Medications:  New Prescriptions    CIPROFLOXACIN (CIPRO) 500 MG TABLET    Take 1 tablet (500 mg) by mouth 2 times daily    METRONIDAZOLE (FLAGYL) 500 MG TABLET    Take 1 tablet (500 mg) by mouth 4 times daily for 7 days         Darrion Barrera  4/24/2018   [facility] [place] EMERGENCY DEPARTMENT  I, Darrion Barrera, am serving as a scribe at 3:24 AM on 4/24/2018 to document services personally performed by Tasha Khan MD based on my observations and the provider's statements to me.       Tasha Khan MD  04/27/18 0128

## 2018-04-24 NOTE — ED TRIAGE NOTES
Pt comes with ll quad abd pain woke at 1230 with   Tender to palpate     Hx of diverticula   Is to have surgery on his knee next month  Rates pain 8/10   Here for eval

## 2018-10-11 ENCOUNTER — APPOINTMENT (OUTPATIENT)
Dept: GENERAL RADIOLOGY | Facility: CLINIC | Age: 83
End: 2018-10-11
Attending: NURSE PRACTITIONER
Payer: MEDICARE

## 2018-10-11 ENCOUNTER — HOSPITAL ENCOUNTER (EMERGENCY)
Facility: CLINIC | Age: 83
Discharge: HOME | End: 2018-10-11
Attending: EMERGENCY MEDICINE | Admitting: EMERGENCY MEDICINE
Payer: MEDICARE

## 2018-10-11 VITALS
BODY MASS INDEX: 23.39 KG/M2 | HEIGHT: 67 IN | WEIGHT: 149 LBS | SYSTOLIC BLOOD PRESSURE: 174 MMHG | HEART RATE: 76 BPM | TEMPERATURE: 97.6 F | DIASTOLIC BLOOD PRESSURE: 91 MMHG | OXYGEN SATURATION: 93 % | RESPIRATION RATE: 18 BRPM

## 2018-10-11 DIAGNOSIS — T14.8XXA BRUISING: ICD-10-CM

## 2018-10-11 DIAGNOSIS — M25.522 LEFT ELBOW PAIN: ICD-10-CM

## 2018-10-11 PROCEDURE — 99283 EMERGENCY DEPT VISIT LOW MDM: CPT

## 2018-10-11 PROCEDURE — 73080 X-RAY EXAM OF ELBOW: CPT | Mod: LT

## 2018-10-11 ASSESSMENT — ENCOUNTER SYMPTOMS
ARTHRALGIAS: 1
NUMBNESS: 0
SHORTNESS OF BREATH: 0
COLOR CHANGE: 1
JOINT SWELLING: 1
WOUND: 0
FEVER: 0
WEAKNESS: 0

## 2018-10-11 NOTE — ED AVS SNAPSHOT
[facility] Emergency Department    [address]    [facility] [address]    Phone:  [phone]    Fax:  [phone]                                       Terry Whalen   MRN: [id number]    Department:  [facility] Emergency Department   Date of Visit:  10/11/2018           Patient Information     Date Of Birth          2/23/1932        Your diagnoses for this visit were:     Left elbow pain     Bruising        You were seen by Josette Carson MD and Ramirez Keith APRN CNP.      Follow-up Information     Follow up with Maru Germain In 1 week.    Specialty:  Family Practice    Why:  if continuned symptoms or sooner if worsening    Contact information:    [facility]  [address]  [facility] [address]  [phone]          [facility]    Why:  tomorrow to schedule appointment for follow up    Contact information:    [address]  [address]  [facility] [address] [phone]        Discharge Instructions         Ibuprofen or Naproxen and/or Tylenol scheduled for the next 3-5 days. 600 mg (three tabs) ibuprofen, 440 mg (two tabs) Naproxen, 650-1000 mg of Tylenol.  Ibuprofen and Tylenol are every 6 hours Naproxen is 2 times daily.    Ice or heat to area.  I recommend ice in the first 24-48 hours.      Arthralgia    Arthralgia is the term for pain in or around the joint. It is a symptom, not a disease. This pain may involve one or more joints. In some cases, the pain moves from joint to joint.  There are many causes for joint pain. These include:    Injury    Osteoarthritis (wearing out of the joint surface)    Gout (inflammation of the joint due to crystals in the joint fluid)    Infection inside the joint      Bursitis (inflammation of the fluid-filled sacs around the joint)    Autoimmune disorders such as rheumatoid arthritis or lupus    Tendonitis (inflammation of chords that attach muscle  to bone)  Home care    Rest the involved joint(s) until your symptoms improve.     You may be prescribed pain medicine. If none is prescribed, you may use acetaminophen or ibuprofen to control pain and inflammation.  Follow-up care  Follow up with your healthcare provider or as advised.  When to seek medical advice  Contact your healthcare provider right away if any of the following occurs:    Pain, swelling, or redness of joint increases    Pain worsens or recurs after a period of improvement    Pain moves to other joints    You cannot bear weight on the affected joint     You cannot move the affected joint    Joint appears deformed    New rash appears    Fever of 100.4 F (38 C) or higher, or as directed by your healthcare provider  Date Last Reviewed: 3/1/2017    8790-3370 [employer]. [address] [place] [address], [place] [address]. All rights reserved. This information is not intended as a substitute for professional medical care. Always follow your healthcare professional's instructions.          24 Hour Appointment Hotline       To make an appointment at any [facility], call [phone] ([phone]). If you don't have a family doctor or clinic, we will help you find one. [facility] are conveniently located to serve the needs of you and your family.             Review of your medicines      Our records show that you are taking the medicines listed below. If these are incorrect, please call your family doctor or clinic.        Dose / Directions Last dose taken    ASPIRIN PO        Take by mouth daily   Refills:  0        ciprofloxacin 500 MG tablet   Commonly known as:  CIPRO   Dose:  500 mg   Quantity:  14 tablet        Take 1 tablet (500 mg) by mouth 2 times daily   Refills:  0        TYLENOL PO   Dose:  650 mg        Take 650 mg by mouth Takes three times daily   Refills:  0                Procedures and tests performed during your visit     Elbow  XR, G/E 3 views, left      Orders  Needing Specimen Collection     None      Pending Results     Date and Time Order Name Status Description    10/11/2018 1923 Elbow  XR, G/E 3 views, left Preliminary             Pending Culture Results     No orders found from 10/9/2018 to 10/12/2018.            Pending Results Instructions     If you had any lab results that were not finalized at the time of your Discharge, you can call the ED Lab Result RN at [phone]. You will be contacted by this team for any positive Lab results or changes in treatment. The nurses are available 7 days a week from 10A to 6:30P.  You can leave a message 24 hours per day and they will return your call.        Test Results From Your Hospital Stay        10/11/2018  8:02 PM      Narrative     LEFT ELBOW THREE VIEWS  10/11/2018 7:48 PM     HISTORY: Pain swelling bruising.     COMPARISON: None.    FINDINGS: Moderate to marked degenerative change of the left elbow. No  posterior fat-pad is seen. No convincing anterior sail sign is seen.  There is no acute fracture or dislocation. There are no worrisome bony  lesions.        Impression     IMPRESSION: No acute osseous abnormality demonstrated.                Clinical Quality Measure: Blood Pressure Screening     Your blood pressure was checked while you were in the emergency department today. The last reading we obtained was  BP: (!) 174/91 . Please read the guidelines below about what these numbers mean and what you should do about them.  If your systolic blood pressure (the top number) is less than 120 and your diastolic blood pressure (the bottom number) is less than 80, then your blood pressure is normal. There is nothing more that you need to do about it.  If your systolic blood pressure (the top number) is 120-139 or your diastolic blood pressure (the bottom number) is 80-89, your blood pressure may be higher than it should be. You should have your blood pressure rechecked within a year by a primary care provider.  If your  "systolic blood pressure (the top number) is 140 or greater or your diastolic blood pressure (the bottom number) is 90 or greater, you may have high blood pressure. High blood pressure is treatable, but if left untreated over time it can put you at risk for heart attack, stroke, or kidney failure. You should have your blood pressure rechecked by a primary care provider within the next 4 weeks.  If your provider in the emergency department today gave you specific instructions to follow-up with your doctor or provider even sooner than that, you should follow that instruction and not wait for up to 4 weeks for your follow-up visit.        Thank you for choosing [facility]       Thank you for choosing [facility] for your care. Our goal is always to provide you with excellent care. Hearing back from our patients is one way we can continue to improve our services. Please take a few minutes to complete the written survey that you may receive in the mail after you visit with us. Thank you!        [employer] Information     [employer] lets you send messages to your doctor, view your test results, renew your prescriptions, schedule appointments and more. To sign up, go to [email or web address] . Click on \"Log in\" on the left side of the screen, which will take you to the Welcome page. Then click on \"Sign up Now\" on the right side of the page.     You will be asked to enter the access code listed below, as well as some personal information. Please follow the directions to create your username and password.     Your access code is: [email or web address]  Expires: 2019  9:04 PM     Your access code will  in 90 days. If you need help or a new code, please call your [facility] clinic or [phone].        Care EveryWhere ID     This is your Care EveryWhere ID. This could be used by other organizations to access your [facility] medical records  [id number]        Equal Access to Services     CARLI RAE: Demarcus Kothari, " zane gupta, tania guzman. So [facility] [phone].    ATENCIÓN: Si habla español, tiene a jane disposición servicios gratuitos de asistencia lingüística. Llame al [phone].    We comply with applicable federal civil rights laws and [place] laws. We do not discriminate on the basis of race, color, national origin, age, disability, sex, sexual orientation, or gender identity.            After Visit Summary       This is your record. Keep this with you and show to your community pharmacist(s) and doctor(s) at your next visit.

## 2018-10-11 NOTE — ED AVS SNAPSHOT
[facility] Emergency Department    [address]    [facility] [address]    Phone:  [phone]    Fax:  [phone]                                       Terry Whalen   MRN: [id number]    Department:  [facility] Emergency Department   Date of Visit:  10/11/2018           After Visit Summary Signature Page     I have received my discharge instructions, and my questions have been answered. I have discussed any challenges I see with this plan with the nurse or doctor.    ..........................................................................................................................................  Patient/Patient Representative Signature      ..........................................................................................................................................  Patient Representative Print Name and Relationship to Patient    ..................................................               ................................................  Date                                   Time    ..........................................................................................................................................  Reviewed by Signature/Title    ...................................................              ..............................................  Date                                               Time          22EPIC Rev 08/18

## 2018-10-12 NOTE — DISCHARGE INSTRUCTIONS
Ibuprofen or Naproxen and/or Tylenol scheduled for the next 3-5 days. 600 mg (three tabs) ibuprofen, 440 mg (two tabs) Naproxen, 650-1000 mg of Tylenol.  Ibuprofen and Tylenol are every 6 hours Naproxen is 2 times daily.    Ice or heat to area.  I recommend ice in the first 24-48 hours.      Arthralgia    Arthralgia is the term for pain in or around the joint. It is a symptom, not a disease. This pain may involve one or more joints. In some cases, the pain moves from joint to joint.  There are many causes for joint pain. These include:    Injury    Osteoarthritis (wearing out of the joint surface)    Gout (inflammation of the joint due to crystals in the joint fluid)    Infection inside the joint      Bursitis (inflammation of the fluid-filled sacs around the joint)    Autoimmune disorders such as rheumatoid arthritis or lupus    Tendonitis (inflammation of chords that attach muscle to bone)  Home care    Rest the involved joint(s) until your symptoms improve.     You may be prescribed pain medicine. If none is prescribed, you may use acetaminophen or ibuprofen to control pain and inflammation.  Follow-up care  Follow up with your healthcare provider or as advised.  When to seek medical advice  Contact your healthcare provider right away if any of the following occurs:    Pain, swelling, or redness of joint increases    Pain worsens or recurs after a period of improvement    Pain moves to other joints    You cannot bear weight on the affected joint     You cannot move the affected joint    Joint appears deformed    New rash appears    Fever of 100.4 F (38 C) or higher, or as directed by your healthcare provider  Date Last Reviewed: 3/1/2017    [id number] [employer]. [address] [place] [address], [place] [address]. All rights reserved. This information is not intended as a substitute for professional medical care. Always follow your healthcare professional's instructions.

## 2018-10-12 NOTE — ED PROVIDER NOTES
History   Chief Complaint:  Elbow Pain    HPI   Terry Whalen is an anticoagulated 86 year old male with a history of hypertension and squamous cell carcinoma who presents with elbow pain. The patient reports that he was in rehab in August, after which he developed pain and swelling in his left elbow. The patient attests to some difficulty with both extension and flexion of the elbow. He was seen just over one month ago for the pain at [facility] where x-ray imaging was obtained as seen below. The patient's son adds that the patient also recently developed a new contusion to the left elbow. The patient denies having any recent falls or trauma to the elbow.     XR Elbow Left 3 Views: 09/02/2018  Left elbow 3 views. There is no fracture or dislocation. Alignment is normal. There is moderately advanced degenerative spurring throughout the elbow. Chondrocalcinosis or calcific debris is seen along the elbow joint anteriorly and posteriorly. There is no significant elbow joint effusion. There are no areas of cortical destruction to suggest osteomyelitis. There is soft tissue swelling about the elbow, greatest laterally. Small enthesophyte projects from the olecranon posteriorly.    Allergies:  Lisinopril  Barbiturates     Medications:    Aspirin    Past Medical History:    Diverticular disease  Hypertension  Squamous cell carcinoma   Paroxysmal supraventricular tachycardia  Chronic pain of both knees    Past Surgical History:    S/p total knee replacement     Family History:    History reviewed. No pertinent family history.     Social History:  Smoking status: Former smoker  Alcohol use: No  Marital Status:  Single [1]    Review of Systems   Constitutional: Negative for fever.   Respiratory: Negative for shortness of breath.    Musculoskeletal: Positive for arthralgias (left elbow) and joint swelling (left elbow).   Skin: Positive for color change (contusion left elbow). Negative for wound.   Neurological: Negative for  "weakness and numbness.   All other systems reviewed and are negative.    Physical Exam   Patient Vitals for the past 24 hrs:   BP Temp Temp src Pulse Resp SpO2 Height Weight   10/11/18 1858 (!) 174/91 97.6  F (36.4  C) Temporal 76 18 93 % 1.702 m (5' 7\") 67.6 kg (149 lb)     Physical Exam  General: Alert, mild  discomfort, well kept   HENT:  Normal voice, No lymphadenopathy  Eyes:  The pupils are equal, round, and reactive to light, Conjunctiva normal, No scleral icterus   Neck:  Normal range of motion  CV:  Normal Pulses  Resp:  Non-labored, No cough  MS:  Normal muscular tone, moves all extremities, left elbow is swollen, tender, crepitous with movement, and contused. Unable to fully extend or flew left elbow. Normal distal sensation and circulation.   Skin:  No rash or acute skin lesions noted  Neuro: Speech is normal and fluent  Psych:  Awake. Alert.  Normal affect.  Appropriate interactions. Good eye contact    Emergency Department Course   Imaging:  Radiographic findings were communicated with the patient who voiced understanding of the findings.    Elbow XR, G/E 3 Views, Left:  No acute osseous abnormality demonstrated.  As read by Radiology.      Emergency Department Course:  Past medical records, nursing notes, and vitals reviewed.  1918: I performed an exam of the patient and obtained history, as documented above.  The patient was sent for a left elbow x-ray while in the emergency department, findings above.    2028: I rechecked the patient. Explained findings to the patient.    Findings and plan explained to the patient. Patient discharged home with instructions regarding supportive care, medications, and reasons to return. The importance of close follow-up was reviewed.     Impression & Plan    Medical Decision Making:  Terry Whalen is a 86 year old male who presents today for evaluation of left elbow pain and bruising.  He was initially seen for similar symptoms 1 month ago and had negative imagery.  He " has had some increasing discomfort swelling and bruising that had been bothering him for the last 2 weeks.  After calling the triage nurse today to try to make an appointment with orthopedics he was advised to come to the emergency department with concerns for possible blood clot.  His examination shows localized bruising and tenderness without any signs of upper extremity DVT.  X-ray was obtained without indication for acute fracture, malalignment, or dislocation.  There is a fair amount of arthritis throughout this.  His physical exam is most consistent with arthritic joint and contusion.  There is no obvious recalled trauma to the area.  He has no other strange bruising and is not on blood thinners.  At this point in time no further imaging or testing appears to be indicated.  He is placed in a sling and advised to rest the arm.  There is no signs of infection or compartment syndrome.  Antibiotics are not indicated.  He appears to be safe and appropriate for outpatient management and follow-up with orthopedics.  He is discharged to home.      Diagnosis:    ICD-10-CM   1. Left elbow pain M25.522   2. Bruising T14.8XXA       Disposition:  Discharged to home.      Roque Wing  10/11/2018   [facility] EMERGENCY DEPARTMENT  I, Roque Wing, am serving as a scribe at 7:18 PM on 10/11/2018 to document services personally performed by Ramirez Keith APRN based on my observations and the provider's statements to me.        Ramirez Keith APRN CNP  10/11/18 2389

## 2019-05-04 ENCOUNTER — HOSPITAL ENCOUNTER (EMERGENCY)
Facility: CLINIC | Age: 84
Discharge: HOME | End: 2019-05-05
Attending: EMERGENCY MEDICINE | Admitting: EMERGENCY MEDICINE
Payer: COMMERCIAL

## 2019-05-04 ENCOUNTER — APPOINTMENT (OUTPATIENT)
Dept: GENERAL RADIOLOGY | Facility: CLINIC | Age: 84
End: 2019-05-04
Attending: EMERGENCY MEDICINE
Payer: COMMERCIAL

## 2019-05-04 DIAGNOSIS — M54.50 ACUTE MIDLINE LOW BACK PAIN WITHOUT SCIATICA: ICD-10-CM
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
## 2019-05-04 PROCEDURE — 72100 X-RAY EXAM L-S SPINE 2/3 VWS: CPT

## 2019-05-04 PROCEDURE — 99284 EMERGENCY DEPT VISIT MOD MDM: CPT | Mod: 25

## 2019-05-04 PROCEDURE — 25000132 ZZH RX MED GY IP 250 OP 250 PS 637: Performed by: EMERGENCY MEDICINE

## 2019-05-04 PROCEDURE — A9270 NON-COVERED ITEM OR SERVICE: HCPCS | Performed by: EMERGENCY MEDICINE

## 2019-05-04 RX ORDER — ACETAMINOPHEN 325 MG/1
975 TABLET ORAL ONCE
Status: COMPLETED | OUTPATIENT
Start: 2019-05-04 | End: 2019-05-04

## 2019-05-04 RX ADMIN — ACETAMINOPHEN 975 MG: 325 TABLET, FILM COATED ORAL at 23:24

## 2019-05-04 NOTE — ED AVS SNAPSHOT
[facility] Emergency Department  [address]  [facility] [address]  Phone:  [phone]  Fax:  [phone]                                    Terry Whalen   MRN: [id number]    Department:  [facility] Emergency Department   Date of Visit:  5/4/2019           After Visit Summary Signature Page    I have received my discharge instructions, and my questions have been answered. I have discussed any challenges I see with this plan with the nurse or doctor.    ..........................................................................................................................................  Patient/Patient Representative Signature      ..........................................................................................................................................  Patient Representative Print Name and Relationship to Patient    ..................................................               ................................................  Date                                   Time    ..........................................................................................................................................  Reviewed by Signature/Title    ...................................................              ..............................................  Date                                               Time          22EPIC Rev 08/18

## 2019-05-05 ENCOUNTER — APPOINTMENT (OUTPATIENT)
Dept: CT IMAGING | Facility: CLINIC | Age: 84
End: 2019-05-05
Attending: EMERGENCY MEDICINE
Payer: COMMERCIAL

## 2019-05-05 VITALS
OXYGEN SATURATION: 95 % | DIASTOLIC BLOOD PRESSURE: 82 MMHG | SYSTOLIC BLOOD PRESSURE: 144 MMHG | TEMPERATURE: 99.1 F | HEART RATE: 82 BPM | RESPIRATION RATE: 16 BRPM

## 2019-05-05 PROCEDURE — 70450 CT HEAD/BRAIN W/O DYE: CPT

## 2019-05-05 NOTE — ED NOTES
Bed: ED19  Expected date: 5/4/19  Expected time: 10:46 PM  Means of arrival: Ambulance  Comments:  Caro Barillas1

## 2019-05-05 NOTE — ED TRIAGE NOTES
"Presents via ambulance after falling in his room while getting ready for bed.  States he \"lost his balance\".  Denies dizziness, just turned to quickly and lost balance.  States he struck his back on his dresser.  Denies LOC, alert and oriented x 4, abc intact. No obvious injuries from this fall.  Does have very swollen bruised, red and painful, also has right shoulder pain from the fall 6 weeks ago  "

## 2019-05-06 ASSESSMENT — ENCOUNTER SYMPTOMS
CARDIOVASCULAR NEGATIVE: 1
GASTROINTESTINAL NEGATIVE: 1
BACK PAIN: 1
HEADACHES: 1
FEVER: 0

## 2019-05-06 NOTE — ED PROVIDER NOTES
History     Chief Complaint:  Fall      HPI   Terry Whalen is a 87 year old male who presents with by ambulance after experiencing an accidental fall at his home.  He uses a walker and baseline.  No prodromal lightheadedness or illnesses.  He did bump his head on dresser, but denies LOC or initial headache, no neck pain.  Complains of midline lower back pain, no neuro symptoms or other injuries.  He was not able to get up on his own, called 911.      Allergies:  Allergies   Allergen Reactions     Prednisone Other (See Comments)     Causes dizziness        Medications:    *  Acetaminophen (TYLENOL PO)   ASPIRIN PO   ciprofloxacin (CIPRO) 500 MG tablet     Past Medical History:    Past Medical History:   Diagnosis Date     Diverticular disease      Hypertension      Social History:  Lives alone,independetly  Marital Status:  Single [1]  Social History     Tobacco Use     Smoking status: Former Smoker     Smokeless tobacco: Never Used   Substance Use Topics     Alcohol use: No     Drug use: Not on file        Review of Systems   Constitutional: Negative for fever.   Cardiovascular: Negative.    Gastrointestinal: Negative.    Musculoskeletal: Positive for back pain.   Neurological: Positive for headaches.   All other systems reviewed and are negative.      Physical Exam   First Vitals:  BP: 141/67  Pulse: 86  Heart Rate: 82  Temp: 99.1  F (37.3  C)  Resp: 18  SpO2: 95 %      Physical Exam  General: Patient is alert and cooperative.  HENT:  No trauma.   Eyes: EOMI. Normal conjunctiva.  Neck:  Normal range of motion and appearance. No tenderness.   Cardiovascular:  Normal rate, regular rhythm and normal heart sounds.   Pulmonary/Chest:  Effort normal. No wheezing or crackles.  Abdominal: Soft. No distension or tenderness.     Musculoskeletal: Normal range of motion. No edema or tenderness, including midline lower back.   Neurological: oriented, normal strength, sensation, and coordination.   Skin: Warm and dry. No rash  or bruising.   Psychiatric: Normal mood and affect. Normal behavior and judgement.      Emergency Department Course   Imaging:  CT Head w/o Contrast   Final Result   IMPRESSION: Chronic small vessel ischemic change and mild atrophy. No   acute intracranial abnormality.       BRENDA MATA MD      Lumbar spine XR, 2-3 views   Final Result   IMPRESSION:  No acute change from prior. Left apex curvature of the   thoracolumbar junction similar to prior. Severe multilevel   degenerative disc disease. Vertebral body height loss at L1 and L3   similar to prior. No listhesis. Aortic calcifications and hip   replacement noted.      BRENDA MATA MD        Interventions:  Medications   acetaminophen (TYLENOL) tablet 975 mg (975 mg Oral Given 5/4/19 5361)       Emergency Department Course:  I reviewed the patient's medical record.  The patient was seen and examined by myself. I discussed the course of care with the patient including laboratory and diagnostic studies.  He understands and is agreeable to the plan.    Impression & Plan      Medical Decision Making:  Sober elderly male suffered a mechanical fall with mild back pain.  Unremarkable examination, negative imaging, including lumbar spine x-ray and ct head.  Ambulatory with walker in ED.  No indication for other tests or admission, family will come get him and bring him home, they live 1/2 block from him.  He is comfortable with plan, tylenol prn.    Diagnosis:    ICD-10-CM    1. Accidental fall, initial encounter W19.XXXA    2. Acute midline low back pain without sciatica M54.5        Disposition:  discharged to home    Discharge Medications:     Medication List      There are no discharge medications for this visit.           Pascual Valera  5/4/2019   [facility] EMERGENCY DEPARTMENT       Pascual Valera MD  05/06/19 [id number]

## 2019-05-09 ENCOUNTER — HOSPITAL ENCOUNTER (EMERGENCY)
Facility: CLINIC | Age: 84
Discharge: HOME | End: 2019-05-09
Attending: PHYSICIAN ASSISTANT | Admitting: PHYSICIAN ASSISTANT
Payer: COMMERCIAL

## 2019-05-09 ENCOUNTER — TRANSFERRED RECORDS (OUTPATIENT)
Dept: HEALTH INFORMATION MANAGEMENT | Facility: CLINIC | Age: 84
End: 2019-05-09

## 2019-05-09 VITALS
DIASTOLIC BLOOD PRESSURE: 95 MMHG | SYSTOLIC BLOOD PRESSURE: 132 MMHG | RESPIRATION RATE: 16 BRPM | OXYGEN SATURATION: 94 % | TEMPERATURE: 98.8 F | HEART RATE: 50 BPM

## 2019-05-09 DIAGNOSIS — I82.621 ACUTE DEEP VEIN THROMBOSIS (DVT) OF BRACHIAL VEIN OF RIGHT UPPER EXTREMITY (H): ICD-10-CM

## 2019-05-09 DIAGNOSIS — S40.021A TRAUMATIC HEMATOMA OF RIGHT UPPER ARM, INITIAL ENCOUNTER: ICD-10-CM

## 2019-05-09 DIAGNOSIS — I80.8 SUPERFICIAL THROMBOPHLEBITIS OF RIGHT UPPER EXTREMITY: ICD-10-CM

## 2019-05-09 LAB
ANION GAP SERPL CALCULATED.3IONS-SCNC: 9 MMOL/L (ref 3–14)
APTT PPP: 29 SEC (ref 22–37)
BASOPHILS # BLD AUTO: 0 10E9/L (ref 0–0.2)
BASOPHILS NFR BLD AUTO: 0.2 %
BUN SERPL-MCNC: 17 MG/DL (ref 7–30)
CALCIUM SERPL-MCNC: 8.9 MG/DL (ref 8.5–10.1)
CHLORIDE SERPL-SCNC: 105 MMOL/L (ref 94–109)
CO2 SERPL-SCNC: 26 MMOL/L (ref 20–32)
CREAT SERPL-MCNC: 0.73 MG/DL (ref 0.66–1.25)
DIFFERENTIAL METHOD BLD: ABNORMAL
EOSINOPHIL # BLD AUTO: 0 10E9/L (ref 0–0.7)
EOSINOPHIL NFR BLD AUTO: 0 %
ERYTHROCYTE [DISTWIDTH] IN BLOOD BY AUTOMATED COUNT: 13.6 % (ref 10–15)
GFR SERPL CREATININE-BSD FRML MDRD: 83 ML/MIN/{1.73_M2}
GLUCOSE SERPL-MCNC: 132 MG/DL (ref 70–99)
HCT VFR BLD AUTO: 32.7 % (ref 40–53)
HGB BLD-MCNC: 10.4 G/DL (ref 13.3–17.7)
IMM GRANULOCYTES # BLD: 0.1 10E9/L (ref 0–0.4)
IMM GRANULOCYTES NFR BLD: 0.9 %
INR PPP: 1.04 (ref 0.86–1.14)
LYMPHOCYTES # BLD AUTO: 1.3 10E9/L (ref 0.8–5.3)
LYMPHOCYTES NFR BLD AUTO: 9.1 %
MCH RBC QN AUTO: 33.3 PG (ref 26.5–33)
MCHC RBC AUTO-ENTMCNC: 31.8 G/DL (ref 31.5–36.5)
MCV RBC AUTO: 105 FL (ref 78–100)
MONOCYTES # BLD AUTO: 0.9 10E9/L (ref 0–1.3)
MONOCYTES NFR BLD AUTO: 6.7 %
NEUTROPHILS # BLD AUTO: 11.5 10E9/L (ref 1.6–8.3)
NEUTROPHILS NFR BLD AUTO: 83.1 %
NRBC # BLD AUTO: 0 10*3/UL
NRBC BLD AUTO-RTO: 0 /100
PLATELET # BLD AUTO: 383 10E9/L (ref 150–450)
POTASSIUM SERPL-SCNC: 3.3 MMOL/L (ref 3.4–5.3)
RBC # BLD AUTO: 3.12 10E12/L (ref 4.4–5.9)
SODIUM SERPL-SCNC: 140 MMOL/L (ref 133–144)
WBC # BLD AUTO: 13.9 10E9/L (ref 4–11)

## 2019-05-09 PROCEDURE — 25000132 ZZH RX MED GY IP 250 OP 250 PS 637: Performed by: PHYSICIAN ASSISTANT

## 2019-05-09 PROCEDURE — 36415 COLL VENOUS BLD VENIPUNCTURE: CPT | Performed by: PHYSICIAN ASSISTANT

## 2019-05-09 PROCEDURE — 85730 THROMBOPLASTIN TIME PARTIAL: CPT | Performed by: PHYSICIAN ASSISTANT

## 2019-05-09 PROCEDURE — 85610 PROTHROMBIN TIME: CPT | Performed by: PHYSICIAN ASSISTANT

## 2019-05-09 PROCEDURE — 80048 BASIC METABOLIC PNL TOTAL CA: CPT | Performed by: PHYSICIAN ASSISTANT

## 2019-05-09 PROCEDURE — 85025 COMPLETE CBC W/AUTO DIFF WBC: CPT | Performed by: PHYSICIAN ASSISTANT

## 2019-05-09 PROCEDURE — 99283 EMERGENCY DEPT VISIT LOW MDM: CPT

## 2019-05-09 RX ADMIN — APIXABAN 10 MG: 5 TABLET, FILM COATED ORAL at 15:24

## 2019-05-09 ASSESSMENT — ENCOUNTER SYMPTOMS
BACK PAIN: 1
COLOR CHANGE: 1
MYALGIAS: 1
CHILLS: 0
WOUND: 0
SHORTNESS OF BREATH: 0
FEVER: 0

## 2019-05-09 NOTE — DISCHARGE INSTRUCTIONS
Stop your aspirin.   Begin taking Eliquis.   Follow up with Dr. Jessa LANCASTER.   Warm compresses to the lower aspect of the right UE.

## 2019-05-09 NOTE — ED PROVIDER NOTES
History     Chief Complaint:  Possible Blood Clots    HPI   Terry Whalen is a 87 year old male who presents with left arm bruising, pain, and swelling. The patient's son in law states the patient suffered a fall roughly a month ago into a fridge hitting his left arm and back, however he was able to help the patient up and there was no need to get the patient evaluated at the time. However, two weeks after the fall the patient's left arm started to become purple and swollen, prompting the [facility] in [facility], however they were unremarkable. A week after that, the patient visited his primary care provider who then had the patient undergo ultra sound testing today at [facility] who confirmed blood clots and advised he visit the emergency department today to be further evaluated. The patient denies any fevers or chills, chest pain or shortness of breath and is currently taking 2 baby aspirin in the morning as well as Tylenol.    US Venous Right Upper Extremity Doppler 5/9/19  1. Positive for acute DVT in the right upper extremity with nonocclusive thrombus in the brachial vein within the proximal humerus.  2. Superficial thrombophlebitis is noted within the cephalic vein at the antecubital fossa and the basilic vein within the proximal forearm.  3. Hematoma within the right upper arm measuring 11.6 x 5.4 x 5.8 cm.  Per Dr. Germain      Allergies:  No Known Drug Allergies    Medications:    Aspirin  Cipro    Past Medical History:    Diverticular disease    Past Surgical History:    History reviewed. No pertinent past surgical history.    Family History:    The patient denies any relevant family medical history.    Social History:  Marital Status: Single   Smoking Status: Former  Alcohol Use: No  Drug Use: No    Review of Systems   Constitutional: Negative for chills and fever.   Respiratory: Negative for shortness of breath.    Cardiovascular: Negative for  chest pain.   Musculoskeletal: Positive for back pain and myalgias.        Left arm swelling   Skin: Positive for color change. Negative for wound.     Physical Exam     Patient Vitals for the past 24 hrs:   BP Temp Temp src Pulse Resp SpO2   05/09/19 1524 -- -- -- -- -- 94 %   05/09/19 1522 (!) 132/95 -- -- 50 -- --   05/09/19 1250 (!) 126/104 98.8  F (37.1  C) Temporal 108 16 99 %     Physical Exam  General: Alert and interactive. Appears well. Cooperative and pleasant.   Eyes: The pupils are equal and round. EOMs intact. No scleral icterus.  ENT: No abnormalities to the external nose or ears. Mucous membranes moist. Posterior oropharynx is non-erythematous.      Neck: Trachea is in the midline. No nuchal rigidity.     CV: Regular rate and rhythm. S1 and S2 normal without murmur, click, gallop or rub. 2+ radial pulse.   Resp: Breath sounds are clear bilaterally, without rhonchi, wheezes, rales. Non-labored, no retractions or accessory muscle use.     GI: Abdomen is soft without distension. No tenderness to palpation. No peritoneal signs.    MS: Moving all extremities well. Good muscle tone. Full range of motion in right upper extremity despite swelling and discoloration.   Skin: Warm and dry. Discoloration to the right upper extremity, as pictured below. There is a large hematoma to the posterior arm. This is mildly tender to palpation and hard, although compartments are soft.               Neuro: Alert and oriented x 3. No focal neurologic deficits. Good strength and sensation in upper and lower extremities.    Psych: Awake. Alert.  Normal affect. Appropriate interactions.  Lymph: No anterior or posterior cervical lymphadenopathy noted.    Emergency Department Course     Laboratory:  BMP: Potassium 3.3 (L), Glucose 132 (H), o/w WNL. (Creatinine 0.73)  PTT: 29  INR: 1.04  CBC: WNL. (WBC 13.9 (H), HGB 10.4 (L), )     Interventions:  1524: 10 mg Eliquis PO    Emergency Department Course:  Past medical  records, nursing notes, and vitals reviewed.  1259: I performed an exam of the patient and obtained history, as documented above. GCS   1441: Staffed patient with Dr. Matos and spoke to the patient's family    IV inserted and blood drawn.    I rechecked the patient. Findings and plan explained to the Patient. Patient discharged home with instructions regarding supportive care, medications, and reasons to return. The importance of close follow-up was reviewed.     Impression & Plan      Medical Decision Making:  Terry Whalen is a 87 year old male who presents to the ED for evaluation of right shoulder swelling and pain after a fall 1 month ago. He has had multiple X-rays over the past month that have been negative. He has had increased swelling which presented two weeks after the fall. Given concern for clot in his upper extremity, ultrasound was obtained as an outpatient today. He was called for a positive result. On examination here, the patient has significant amounts of swelling to his right upper extremity including a large hematoma in the posterior aspect of his right arm. He has a full radial pulse and good sensation/circulation in his fingers, and I have a low suspicion for an arterial occlusion at this time. US shows signs of a brachial DVT, superficial thrombophlebitis in the forearm and a large hematoma. Leukocytosis on exam is non-specific, although he has no fever/chills here, the arm is not warm, and I have a low suspicion for superimposed cellulitis. Platelets are normal and hemoglobin is stable, albeit low.     I have suggested he use warm compresses to his superficial thrombophlebitis, and I had a long discussion with him about anticoagulation. We discussed the risk sand benefits of this, including the possibility of PE or death if not treated appropriately and the risk of bleeding associated with anticoagulation. We discussed Warfarin versus NOAC, such as Eliquis or Xarelto. After discussion  about these two options he has elected to begin Eliquis treatment. He was given his first dose here as well as a full month of treatment in prescription form. He will follow up with primary care in two days for re-assessment and continued scripts. His pain seems well controlled here and he actually only takes Tylenol for pain. I suggested he hold his aspirin until follow up with primary care. No chest pain or shortness of breath or reason to be concerned for PE at this time. I will not obtain a CT scan, troponin, or EKG. The patient understands, and his son-in-law was present during entire conversation. I believe he is able to be discharged with close primary follow up.    Of note, staffed this patient with Dr. Matos, see his note for further details.   Diagnosis:    ICD-10-CM   1. Acute deep vein thrombosis (DVT) of brachial vein of right upper extremity (H) I82.621   2. Superficial thrombophlebitis of right upper extremity I80.8   3. Traumatic hematoma of right upper arm, initial encounter S40.021A       Disposition:  discharged to home    Discharge Medications:     Medication List      Started    apixaban ANTICOAGULANT 5 MG tablet  Commonly known as:  ELIQUIS  10 mg (2 tabs) twice daily for the next week. 5 mg (1 tab) twice daily for following three weeks.          Gagan Bahena  5/9/2019   [facility] EMERGENCY DEPARTMENT    I, Gagan Bahena, am serving as a scribe at 12:59 PM on 5/9/2019 to document services personally performed by Chloe Valverde PA-C based on my observations and the provider's statements to me.          Chloe Valverde PA-C  05/09/19 1959

## 2019-05-09 NOTE — ED TRIAGE NOTES
Fall 1 month ago.  Right arm injury.  Had scans done today, found blood clots in right arm, sent to ED.  ABCDs intact.  Right arm swelling, bruising noted.

## 2019-05-09 NOTE — ED AVS SNAPSHOT
[facility] Emergency Department  [address]  [facility] [address]  Phone:  [phone]  Fax:  [phone]                                    Terry Whalen   MRN: [id number]    Department:  [facility] Emergency Department   Date of Visit:  5/9/2019           After Visit Summary Signature Page    I have received my discharge instructions, and my questions have been answered. I have discussed any challenges I see with this plan with the nurse or doctor.    ..........................................................................................................................................  Patient/Patient Representative Signature      ..........................................................................................................................................  Patient Representative Print Name and Relationship to Patient    ..................................................               ................................................  Date                                   Time    ..........................................................................................................................................  Reviewed by Signature/Title    ...................................................              ..............................................  Date                                               Time          22EPIC Rev 08/18

## 2020-01-28 ENCOUNTER — APPOINTMENT (OUTPATIENT)
Dept: CT IMAGING | Facility: CLINIC | Age: 85
End: 2020-01-28
Attending: EMERGENCY MEDICINE
Payer: COMMERCIAL

## 2020-01-28 ENCOUNTER — HOSPITAL ENCOUNTER (EMERGENCY)
Facility: CLINIC | Age: 85
Discharge: HOME | End: 2020-01-28
Attending: EMERGENCY MEDICINE | Admitting: EMERGENCY MEDICINE
Payer: COMMERCIAL

## 2020-01-28 VITALS
TEMPERATURE: 98.2 F | SYSTOLIC BLOOD PRESSURE: 198 MMHG | BODY MASS INDEX: 21.86 KG/M2 | OXYGEN SATURATION: 97 % | RESPIRATION RATE: 21 BRPM | DIASTOLIC BLOOD PRESSURE: 101 MMHG | HEART RATE: 65 BPM | WEIGHT: 139.55 LBS

## 2020-01-28 DIAGNOSIS — R19.7 DIARRHEA, UNSPECIFIED TYPE: ICD-10-CM

## 2020-01-28 DIAGNOSIS — K92.1 BLACK STOOL: ICD-10-CM

## 2020-01-28 DIAGNOSIS — R16.0 HYPODENSE MASS OF LIVER: ICD-10-CM

## 2020-01-28 DIAGNOSIS — I10 HYPERTENSION, UNSPECIFIED TYPE: ICD-10-CM

## 2020-01-28 LAB
ABO + RH BLD: NORMAL
ABO + RH BLD: NORMAL
ALBUMIN SERPL-MCNC: 3.8 G/DL (ref 3.4–5)
ALP SERPL-CCNC: 105 U/L (ref 40–150)
ALT SERPL W P-5'-P-CCNC: 17 U/L (ref 0–70)
ANION GAP SERPL CALCULATED.3IONS-SCNC: 4 MMOL/L (ref 3–14)
AST SERPL W P-5'-P-CCNC: 24 U/L (ref 0–45)
BASOPHILS # BLD AUTO: 0.1 10E9/L (ref 0–0.2)
BASOPHILS NFR BLD AUTO: 1.1 %
BILIRUB SERPL-MCNC: 0.6 MG/DL (ref 0.2–1.3)
BLD GP AB SCN SERPL QL: NORMAL
BLOOD BANK CMNT PATIENT-IMP: NORMAL
BUN SERPL-MCNC: 13 MG/DL (ref 7–30)
CALCIUM SERPL-MCNC: 9.1 MG/DL (ref 8.5–10.1)
CHLORIDE SERPL-SCNC: 108 MMOL/L (ref 94–109)
CO2 SERPL-SCNC: 29 MMOL/L (ref 20–32)
CREAT SERPL-MCNC: 0.7 MG/DL (ref 0.66–1.25)
DIFFERENTIAL METHOD BLD: ABNORMAL
EOSINOPHIL # BLD AUTO: 0.1 10E9/L (ref 0–0.7)
EOSINOPHIL NFR BLD AUTO: 1.2 %
ERYTHROCYTE [DISTWIDTH] IN BLOOD BY AUTOMATED COUNT: 14.1 % (ref 10–15)
GFR SERPL CREATININE-BSD FRML MDRD: 84 ML/MIN/{1.73_M2}
GLUCOSE SERPL-MCNC: 95 MG/DL (ref 70–99)
HCT VFR BLD AUTO: 40.4 % (ref 40–53)
HEMOCCULT STL QL: NEGATIVE
HGB BLD-MCNC: 12.9 G/DL (ref 13.3–17.7)
IMM GRANULOCYTES # BLD: 0 10E9/L (ref 0–0.4)
IMM GRANULOCYTES NFR BLD: 0.4 %
INR PPP: 1.13 (ref 0.86–1.14)
LACTATE BLD-SCNC: 1.3 MMOL/L (ref 0.7–2)
LIPASE SERPL-CCNC: 47 U/L (ref 73–393)
LYMPHOCYTES # BLD AUTO: 0.9 10E9/L (ref 0.8–5.3)
LYMPHOCYTES NFR BLD AUTO: 11.2 %
MCH RBC QN AUTO: 33.2 PG (ref 26.5–33)
MCHC RBC AUTO-ENTMCNC: 31.9 G/DL (ref 31.5–36.5)
MCV RBC AUTO: 104 FL (ref 78–100)
MONOCYTES # BLD AUTO: 0.7 10E9/L (ref 0–1.3)
MONOCYTES NFR BLD AUTO: 8.8 %
NEUTROPHILS # BLD AUTO: 6.4 10E9/L (ref 1.6–8.3)
NEUTROPHILS NFR BLD AUTO: 77.3 %
NRBC # BLD AUTO: 0 10*3/UL
NRBC BLD AUTO-RTO: 0 /100
PLATELET # BLD AUTO: 289 10E9/L (ref 150–450)
POTASSIUM SERPL-SCNC: 3.5 MMOL/L (ref 3.4–5.3)
PROT SERPL-MCNC: 7.1 G/DL (ref 6.8–8.8)
RBC # BLD AUTO: 3.88 10E12/L (ref 4.4–5.9)
SODIUM SERPL-SCNC: 141 MMOL/L (ref 133–144)
SPECIMEN EXP DATE BLD: NORMAL
WBC # BLD AUTO: 8.3 10E9/L (ref 4–11)

## 2020-01-28 PROCEDURE — 87506 IADNA-DNA/RNA PROBE TQ 6-11: CPT | Performed by: EMERGENCY MEDICINE

## 2020-01-28 PROCEDURE — 99285 EMERGENCY DEPT VISIT HI MDM: CPT | Mod: 25

## 2020-01-28 PROCEDURE — 85025 COMPLETE CBC W/AUTO DIFF WBC: CPT | Performed by: EMERGENCY MEDICINE

## 2020-01-28 PROCEDURE — 86900 BLOOD TYPING SEROLOGIC ABO: CPT | Performed by: EMERGENCY MEDICINE

## 2020-01-28 PROCEDURE — 86901 BLOOD TYPING SEROLOGIC RH(D): CPT | Performed by: EMERGENCY MEDICINE

## 2020-01-28 PROCEDURE — 93005 ELECTROCARDIOGRAM TRACING: CPT

## 2020-01-28 PROCEDURE — 25000125 ZZHC RX 250: Performed by: EMERGENCY MEDICINE

## 2020-01-28 PROCEDURE — 74177 CT ABD & PELVIS W/CONTRAST: CPT

## 2020-01-28 PROCEDURE — 25800030 ZZH RX IP 258 OP 636: Performed by: EMERGENCY MEDICINE

## 2020-01-28 PROCEDURE — 86850 RBC ANTIBODY SCREEN: CPT | Performed by: EMERGENCY MEDICINE

## 2020-01-28 PROCEDURE — 80053 COMPREHEN METABOLIC PANEL: CPT | Performed by: EMERGENCY MEDICINE

## 2020-01-28 PROCEDURE — 85610 PROTHROMBIN TIME: CPT | Performed by: EMERGENCY MEDICINE

## 2020-01-28 PROCEDURE — 25000128 H RX IP 250 OP 636: Performed by: EMERGENCY MEDICINE

## 2020-01-28 PROCEDURE — 82272 OCCULT BLD FECES 1-3 TESTS: CPT | Performed by: EMERGENCY MEDICINE

## 2020-01-28 PROCEDURE — 96376 TX/PRO/DX INJ SAME DRUG ADON: CPT

## 2020-01-28 PROCEDURE — C9113 INJ PANTOPRAZOLE SODIUM, VIA: HCPCS | Performed by: EMERGENCY MEDICINE

## 2020-01-28 PROCEDURE — 96365 THER/PROPH/DIAG IV INF INIT: CPT | Mod: 59

## 2020-01-28 PROCEDURE — 82565 ASSAY OF CREATININE: CPT

## 2020-01-28 PROCEDURE — 83605 ASSAY OF LACTIC ACID: CPT | Performed by: EMERGENCY MEDICINE

## 2020-01-28 PROCEDURE — 83690 ASSAY OF LIPASE: CPT | Performed by: EMERGENCY MEDICINE

## 2020-01-28 RX ORDER — IOPAMIDOL 755 MG/ML
500 INJECTION, SOLUTION INTRAVASCULAR ONCE
Status: COMPLETED | OUTPATIENT
Start: 2020-01-28 | End: 2020-01-28

## 2020-01-28 RX ADMIN — IOPAMIDOL 70 ML: 755 INJECTION, SOLUTION INTRAVENOUS at 16:59

## 2020-01-28 RX ADMIN — SODIUM CHLORIDE 57 ML: 9 INJECTION, SOLUTION INTRAVENOUS at 16:59

## 2020-01-28 RX ADMIN — SODIUM CHLORIDE 1000 ML: 9 INJECTION, SOLUTION INTRAVENOUS at 15:52

## 2020-01-28 RX ADMIN — PANTOPRAZOLE SODIUM 80 MG: 40 INJECTION, POWDER, FOR SOLUTION INTRAVENOUS at 15:54

## 2020-01-28 RX ADMIN — SODIUM CHLORIDE 8 MG/HR: 9 INJECTION, SOLUTION INTRAVENOUS at 15:47

## 2020-01-28 ASSESSMENT — ENCOUNTER SYMPTOMS
FEVER: 0
SHORTNESS OF BREATH: 0
DIZZINESS: 0
CHEST TIGHTNESS: 0
DIARRHEA: 1
BLOOD IN STOOL: 1
LIGHT-HEADEDNESS: 0
ABDOMINAL PAIN: 1

## 2020-01-28 NOTE — ED PROVIDER NOTES
"  History     Chief Complaint:  Rectal Bleeding    HPI   Terry Whalen is a 87 year old male, on baby aspirin, with a history of hypertension, diverticular disease, among others who presents with son in-law from clinic for evaluation of black stools, associated with intermittent abdominal pain and diarrhea, that began 1.5 weeks ago ago. The patient states about 1.5 weeks ago he had the urge to have a bowel movement but was unable to make it to the bathroom and noticed black stool. Since initial onset of symptoms, he has had continued black stools and intermittent abdominal \"gurgling\". Symptoms not subsiding prompted his presentation to his primary physician, who prompted his presentation to have a CT scan.     Here, the patient states he takes an aspirin in the morning and no other medication. He denies any dizziness, lightheadedness, chest pain, trouble breathing, or fever. He does not have a history of liver disease, ibuprofen use, or alcohol abuse.     Of note, the patient was been taking Pepto Bismol for the past week.     Allergies:  Barbiturates  Lisinopril      Medications:    Aspirin 81 mg     Past Medical History:    History of squamous cell carcinoma  Chronic pain of both knee  Paroxysmal supraventricular tachycardia  Hypertension  Diverticular disease     Past Surgical History:    Extracapsular cataract removal, bilateral  Knee arthroplasty     Family History:    The patient denies any relevant family medical history.     Social History:  The patient was accompanied to the ED by son in-law.  Smoking Status: Former, 1963  Smokeless Tobacco: Never  Alcohol Use: No  Drug Use: No   Lives alone in a house  Marital Status:  Single [1]     Review of Systems   Constitutional: Negative for fever.   Respiratory: Negative for chest tightness and shortness of breath.    Cardiovascular: Negative for chest pain.   Gastrointestinal: Positive for abdominal pain, blood in stool and diarrhea.   Neurological: Negative for " dizziness and light-headedness.   All other systems reviewed and are negative.      Physical Exam     Patient Vitals for the past 24 hrs:   BP Temp Temp src Pulse Heart Rate Resp SpO2 Weight   01/28/20 1810 (!) 198/101 -- -- 65 79 21 97 % --   01/28/20 1600 (!) 187/86 -- -- 78 -- -- 98 % --   01/28/20 1559 (!) 187/86 -- -- 78 -- -- 98 % --   01/28/20 1436 (!) 204/102 98.2  F (36.8  C) Oral -- 91 20 98 % 63.3 kg (139 lb 8.8 oz)      Physical Exam  VS: Reviewed per above  HENT: Mucous membranes moist  EYES: sclera anicteric  CV: Rate as noted, regular rhythm.   RESP: Effort normal. Breath sounds are normal bilaterally.  GI: no tenderness/rebound/guarding, not distended.  Rectal: no pain on MADDISON, no masses appreciated about rectum. Black colored liquid stool around rectum.  NEURO: Alert, moving all extremities  MSK: No deformity of the extremities  SKIN: Warm and dry    Emergency Department Course     ECG:  ECG taken at 1603, ECG read at 1605 by Dr. Luis Collier MD  Normal sinus rhythm  Left axis deviation  Abnormal ECG  Rate 75 bpm. NE interval 140. QRS duration 82. QT/QTc 396/442. P-R-T axes 33 -42 -1.     Imaging:  Radiology findings were communicated with the patient who voiced understanding of the findings.    CT Abdomen Pelvis w Contrast  IMPRESSION:   1.  Sigmoid diverticulosis. Moderate stool burden in the colon.  2.  Hypodensity in the right hepatic lobe as above. Ultrasound characterization to exclude a solid mass suggested.  Reading per radiology.     Laboratory:  Laboratory findings were communicated with the patient who voiced understanding of the findings.    CBC: HGB 12.9 (L) o/w WNL (WBC 8.3, )   CMP: AWNL   Lactic Acid (Collected at 1543): 1.3   Lipase: 47 (L)   INR: 1.13     AB0/Rh type and screen: O Rh Positive. Antibodies Negative     Stool: Occult blood: Negative  Enteric Bacteria and Virus Panel by BK Stool: Pending    Interventions:  1552 0.9% NaCl bolus 1000 mL IV   1554 Protonix 40  mg IV  1547 Protonix 80 mg infusion IV     Emergency Department Course:  Nursing notes and vitals reviewed.  EKG obtained in the ED, see results above.    The patient was sent for a CT Abdomen Pelvis w Contrast while in the emergency department, results above.    IV was inserted and blood was drawn for laboratory testing, results above.   A stool sample was collected and underwent laboratory testing, findings above.     (1506)   I performed an exam of the patient as documented above. History obtained from patient.    (1511)   I performed a rectal exam on the patient.     (1624)   I rechecked the patient.     (1805)   I rechecked the patient and discussed results and plan of care.     (1808)   I spoke with the patient's son in-law and discussed findings and plan of care.    Findings and plan explained to the Patient. Patient discharged home with instructions regarding supportive care, medications, and reasons to return. The importance of close follow-up was reviewed. I personally reviewed the laboratory results with the Patient and answered all related questions prior to discharge.    Impression & Plan      Medical Decision Making:  Patient presents to the ER for evaluation of black stool, diarrhea.  On arrival patient is hypertensive.  Exam without any abdominal tenderness or peritoneal signs.  On rectal exam there is black-colored liquid stool about the rectum but no rectal masses or pain on digital rectal exam.  Was initially given Protonix bolus and started on Protonix infusion due to concern for GI bleed but hemoglobin is stable and guaiac testing is actually negative.  On further discussion, patient states he has been taking Pepto-Bismol for his diarrhea recently.  I suspect this is the cause for the black discoloration of his stool.  No fever or leukocytosis to suggest occult invasive bacterial diarrhea.  Stool sample was sent for further testing.    Instructed patient and family that he would need to  follow-up with his doctor for the results of this.  CT of the abdomen does not reveal any acute concerns, incidental liver mass was relayed to patient. He was given IV fluids but no evidence to suggest significant dehydration. Patient remained hypertensive while in the ER. He reports he is not taking many medicines that his doctor had recommended and I wonder if he would benefit from an antihypertensive. Patient remained hemodynamically stable in the ER.  Upon signout to my colleague, he was awaiting to be picked up by his son-in-law.  Plan for follow-up in the clinic setting to discuss elevated blood pressure, stool studies, ongoing diarrhea.    Diagnosis:    ICD-10-CM    1. Black stool K92.1    2. Diarrhea, unspecified type R19.7    3. Hypodense mass of liver R16.0    4. Hypertension, unspecified type I10         Disposition:   Discharge to home.      Scribe Disclosure:  I, Kennedy Roberson, am serving as a scribe at 2:56 PM on 1/28/2020 to document services personally performed by Luis Colleir MD based on my observations and the provider's statements to me.     1/28/2020   [facility] EMERGENCY DEPARTMENT       Luis Collier MD  01/28/20 1905

## 2020-01-28 NOTE — ED AVS SNAPSHOT
[facility] Emergency Department  [address]  [facility] [address]  Phone:  [phone]  Fax:  [phone]                                    Terry Whalen   MRN: [id number]    Department:  [facility] Emergency Department   Date of Visit:  1/28/2020           After Visit Summary Signature Page    I have received my discharge instructions, and my questions have been answered. I have discussed any challenges I see with this plan with the nurse or doctor.    ..........................................................................................................................................  Patient/Patient Representative Signature      ..........................................................................................................................................  Patient Representative Print Name and Relationship to Patient    ..................................................               ................................................  Date                                   Time    ..........................................................................................................................................  Reviewed by Signature/Title    ...................................................              ..............................................  Date                                               Time          22EPIC Rev 08/18

## 2020-01-28 NOTE — ED NOTES
[facility]  ED Nurse Handoff Report    Terry Whalen is a 87 year old male   ED Chief complaint: Rectal Bleeding  . ED Diagnosis:   Final diagnoses:   None     Allergies: No Known Allergies    Code Status: Full Code  Activity level - Baseline/Home:  Independent. Activity Level - Current:   Assist X 1. Lift room needed: No. Bariatric: No   Needed: No   Isolation: No. Infection: Not Applicable  BACTERIA AND VIRUS PANEL PENDING    Vital Signs:   Vitals:    01/28/20 1436 01/28/20 1559   BP: (!) 204/102 (!) 187/86   Pulse:  78   Resp: 20    Temp: 98.2  F (36.8  C)    TempSrc: Oral    SpO2: 98% 98%   Weight: 63.3 kg (139 lb 8.8 oz)      Cardiac Rhythm:  ,       Sinus rhythm  Left axis deviation  Abnormal ECG  No previous ECGs available  Pain level:    Patient confused: No. Patient Falls Risk: Yes.   Elimination Status: Has voided   Patient Report / Focused Assessment:    Gastrointestinal Gastrointestinal - GI WDL: all  Gastrointestinal Comment: PT SENT OVER FROM CLINIC AFTER A WEEK + OF MULTIPLE BLOODY STOOLS AND ABD PAIN.    Tests Performed / Abnormal Results:    CT Abdomen Pelvis w Contrast    (Results Pending)     Labs Ordered and Resulted from Time of ED Arrival Up to the Time of Departure from the ED   CBC WITH PLATELETS DIFFERENTIAL - Abnormal; Notable for the following components:       Result Value    RBC Count 3.88 (*)     Hemoglobin 12.9 (*)      (*)     MCH 33.2 (*)     All other components within normal limits   LIPASE - Abnormal; Notable for the following components:    Lipase 47 (*)     All other components within normal limits   COMPREHENSIVE METABOLIC PANEL   LACTIC ACID WHOLE BLOOD   OCCULT BLOOD STOOL   INR   ISTAT CREATININE NURSING POCT   CARDIAC CONTINUOUS MONITORING   ABO/RH TYPE AND SCREEN   ENTERIC BACTERIA AND VIRUS PANEL BY BK STOOL   Treatments provided: PIV, LAB, STOOL, CT SCAN, PROTONIX, IVFLUIDS, CARDIAC, BP AND PULSE OX.  Family Comments: NONE  OBS  brochure/video discussed/provided to patient:  No  ED Medications:   Medications   0.9% sodium chloride BOLUS (1,000 mLs Intravenous New Bag 1/28/20 1552)   pantoprazole (PROTONIX) 40 mg IV push injection (80 mg Intravenous Given 1/28/20 1554)   iopamidol (ISOVUE-370) solution 500 mL (70 mLs Intravenous Given 1/28/20 1659)   CT Scan Flush (57 mLs Intravenous Given 1/28/20 1659)     Drips infusing:  Yes  For the majority of the shift, the patient's behavior Green. Interventions performed were NONE.    Severe Sepsis OR Septic Shock Diagnosis Present: No    ED Nurse Name/Phone Number: Ciara Mahmood RN, [phone]  5:10 PM

## 2020-01-29 LAB
C COLI+JEJUNI+LARI FUSA STL QL NAA+PROBE: NOT DETECTED
CREAT BLD-MCNC: 0.6 MG/DL (ref 0.66–1.25)
EC STX1 GENE STL QL NAA+PROBE: NOT DETECTED
EC STX2 GENE STL QL NAA+PROBE: NOT DETECTED
ENTERIC PATHOGEN COMMENT: NORMAL
GFR SERPL CREATININE-BSD FRML MDRD: >90 ML/MIN/{1.73_M2}
INTERPRETATION ECG - MUSE: NORMAL
NOROV GI+II ORF1-ORF2 JNC STL QL NAA+PR: NOT DETECTED
RVA NSP5 STL QL NAA+PROBE: NOT DETECTED
SALMONELLA SP RPOD STL QL NAA+PROBE: NOT DETECTED
SHIGELLA SP+EIEC IPAH STL QL NAA+PROBE: NOT DETECTED
V CHOL+PARA RFBL+TRKH+TNAA STL QL NAA+PR: NOT DETECTED
Y ENTERO RECN STL QL NAA+PROBE: NOT DETECTED

## 2020-01-29 NOTE — DISCHARGE INSTRUCTIONS
Talk to your doctor about your high blood pressure today and diarrhea.     Discharge Instructions  Adult Diarrhea    You have been seen today for diarrhea (loose stools). This is usually caused by a virus, but some bacteria, parasites, medicines, or other medical conditions can cause similar symptoms. At this time your provider does not find that your diarrhea is a sign of anything dangerous or life-threatening. However, sometimes the signs of serious illness do not show up right away. If you have new or worse symptoms, you may need to be seen again in the Emergency Department or by your primary provider.     Generally, every Emergency Department visit should have a follow-up clinic visit with either a primary or a specialty clinic/provider. Please follow-up as instructed by your emergency provider today.    Return to the Emergency Department if:  You feel you are getting dehydrated, such as being very thirsty, not urinating (peeing) like usual, or feeling faint or lightheaded.   You develop a new fever.  You have abdominal (belly) pain that seems worse than cramps, is in one spot, or is getting worse over time.   You have blood in your stool or your stool becomes black.  (Remember that if you take Pepto-Bismol , this will turn your stool black).   You feel very weak.    What can I do to help myself?  The most important thing to do is to drink clear liquids.   It is best to have only small, frequent sips of liquids. Drinking too much at once may cause more diarrhea. You should also replace minerals, sodium and potassium lost with diarrhea. Pedialyte  and sports drinks can help you replace these minerals. You can also drink clear liquids such as water, weak tea, apple juice, and 7-Up . Avoid acidic liquids (orange juice), caffeine (coffee) or alcohol. Milk products will make the diarrhea worse.  Eat bland (plain) foods. Soda crackers, toast, plain noodles, gelatin, applesauce and bananas are good first choices. Avoid  "foods that have acid, are spicy, fatty or fibrous (such as meats, coarse grains, vegetables). You may start eating these foods again in about 3 days when you are better.   Sometimes treatment includes prescription medicine to prevent diarrhea. If your provider prescribes these for you, take them as directed.   Nonprescription medicine is available for the treatment of diarrhea and can be very effective. If you use it, make sure you use the dose recommended on the package. Check with your healthcare provider before you use any medicine for diarrhea.   Do not take ibuprofen, or other nonsteroidal anti-inflammatory medicines, without checking with your healthcare provider.   Probiotics: If you have been given an antibiotic, you may want to also take a probiotic pill or eat yogurt with live cultures. Probiotics have \"good bacteria\" to help your intestines stay healthy. Studies have shown that probiotics help prevent diarrhea and other intestine problems (including C. diff infection) when you take antibiotics. You can buy these without a prescription in the pharmacy section of the store.   If you were given a prescription for medicine here today, be sure to read all of the information (including the package insert) that comes with your prescription.  This will include important information about the medicine, its side effects, and any warnings that you need to know about.  The pharmacist who fills the prescription can provide more information and answer questions you may have about the medicine.  If you have questions or concerns that the pharmacist cannot address, please call or return to the Emergency Department.  Remember that you can always come back to the Emergency Department if you are not able to see your regular provider in the amount of time listed above, if you get any new symptoms, or if there is anything that worries you.      Discharge Instructions  Hypertension - High Blood Pressure    During you visit to " the Emergency Department, your blood pressure was higher than the recommended blood pressure.  This may be related to stress, pain, medication or other temporary conditions. In these cases, your blood pressure may return to normal on its own. If you have a history of high blood pressure, you may need to have your provider adjust your medications. Sometimes, your high measurement here may indicate that you have developed high blood pressure that will stay high unless it is treated. As a general rule, high blood pressure causes problems over years rather than days, weeks, or months. So, while it is important to treat blood pressure, it is rarely important to treat blood pressure immediately. Occasionally we will begin a medication in the Emergency Department; more often we will recommend close follow-up for medications with a primary doctor/clinic.    Generally, every Emergency Department visit should have a follow-up clinic visit with either a primary or a specialty clinic/provider. Please follow-up as instructed by your emergency provider today.    Return to the Emergency Department if you start to have:  A severe headache.  Chest pain.  Shortness of breath.  Weakness or numbness that affects one part of the body.  Confusion.  Vision changes.  Significant swelling of legs and/or eyes.  A reaction to any medication started in the Emergency Department.    What can I do to help myself?  Avoid alcohol.  Take any blood pressure medicine that you are prescribed.  Get a good night s sleep.  Lower your salt intake.  Exercise.  Lose weight.  Manage stress.  See your doctor regularly    If blood pressure medication was started in the Emergency Department:  The medicine may not have an immediate effect. The body and brain determine what blood pressure you have. The medicine s job is to retrain the body s  thermostat  to a lower blood pressure.  You will need to follow up with your provider to see how this medicine is working  for you.  If you were given a prescription for medicine here today, be sure to read all of the information (including the package insert) that comes with your prescription.  This will include important information about the medicine, its side effects, and any warnings that you need to know about.  The pharmacist who fills the prescription can provide more information and answer questions you may have about the medicine.  If you have questions or concerns that the pharmacist cannot address, please call or return to the Emergency Department.   Remember that you can always come back to the Emergency Department if you are not able to see your regular provider in the amount of time listed above, if you get any new symptoms, or if there is anything that worries you.

## 2020-01-29 NOTE — RESULT ENCOUNTER NOTE
Final Enteric Bacteria and Virus Panel by BK Stool is NEGATIVE for Campylobacter group, Salmonella species, Shigella species, Shiga toxin 1 gene, Shiga toxin 2 gene, Vibrio group,  Yersinia enterocolitica,  Rotavirus A,  and Norovirus I and II.    No treatment or change in treatment per [facility] Lab Result protocol.

## 2020-09-15 ENCOUNTER — RECORDS - HEALTHEAST (OUTPATIENT)
Dept: LAB | Facility: CLINIC | Age: 85
End: 2020-09-15

## 2020-09-16 LAB
ALBUMIN SERPL-MCNC: 3.8 G/DL (ref 3.5–5)
ANION GAP SERPL CALCULATED.3IONS-SCNC: 8 MMOL/L (ref 5–18)
BUN SERPL-MCNC: 20 MG/DL (ref 8–28)
CALCIUM SERPL-MCNC: 9.3 MG/DL (ref 8.5–10.5)
CHLORIDE BLD-SCNC: 106 MMOL/L (ref 98–107)
CO2 SERPL-SCNC: 26 MMOL/L (ref 22–31)
CREAT SERPL-MCNC: 0.79 MG/DL (ref 0.7–1.3)
GFR SERPL CREATININE-BSD FRML MDRD: >60 ML/MIN/1.73M2
GLUCOSE BLD-MCNC: 86 MG/DL (ref 70–125)
PHOSPHATE SERPL-MCNC: 3 MG/DL (ref 2.5–4.5)
POTASSIUM BLD-SCNC: 4.3 MMOL/L (ref 3.5–5)
SODIUM SERPL-SCNC: 140 MMOL/L (ref 136–145)

## 2021-01-03 ENCOUNTER — HEALTH MAINTENANCE LETTER (OUTPATIENT)
Age: 86
End: 2021-01-03

## 2021-04-06 ENCOUNTER — RECORDS - HEALTHEAST (OUTPATIENT)
Dept: LAB | Facility: CLINIC | Age: 86
End: 2021-04-06

## 2021-04-07 LAB
ANION GAP SERPL CALCULATED.3IONS-SCNC: 8 MMOL/L (ref 5–18)
BUN SERPL-MCNC: 22 MG/DL (ref 8–28)
CALCIUM SERPL-MCNC: 9 MG/DL (ref 8.5–10.5)
CHLORIDE BLD-SCNC: 111 MMOL/L (ref 98–107)
CO2 SERPL-SCNC: 23 MMOL/L (ref 22–31)
CREAT SERPL-MCNC: 0.77 MG/DL (ref 0.7–1.3)
GFR SERPL CREATININE-BSD FRML MDRD: >60 ML/MIN/1.73M2
GLUCOSE BLD-MCNC: 87 MG/DL (ref 70–125)
POTASSIUM BLD-SCNC: 4.2 MMOL/L (ref 3.5–5)
SODIUM SERPL-SCNC: 142 MMOL/L (ref 136–145)

## 2021-10-10 ENCOUNTER — HEALTH MAINTENANCE LETTER (OUTPATIENT)
Age: 86
End: 2021-10-10

## 2021-10-21 ENCOUNTER — LAB REQUISITION (OUTPATIENT)
Dept: LAB | Facility: CLINIC | Age: 86
End: 2021-10-21
Payer: COMMERCIAL

## 2021-10-21 DIAGNOSIS — D51.9 VITAMIN B12 DEFICIENCY ANEMIA, UNSPECIFIED: ICD-10-CM

## 2021-10-22 LAB — VIT B12 SERPL-MCNC: 348 PG/ML (ref 213–816)

## 2021-10-22 PROCEDURE — 36415 COLL VENOUS BLD VENIPUNCTURE: CPT | Mod: ORL | Performed by: PHYSICAL MEDICINE & REHABILITATION

## 2021-10-22 PROCEDURE — 82607 VITAMIN B-12: CPT | Mod: ORL | Performed by: PHYSICAL MEDICINE & REHABILITATION

## 2021-10-22 PROCEDURE — P9604 ONE-WAY ALLOW PRORATED TRIP: HCPCS | Mod: ORL | Performed by: PHYSICAL MEDICINE & REHABILITATION

## 2022-01-29 ENCOUNTER — HEALTH MAINTENANCE LETTER (OUTPATIENT)
Age: 87
End: 2022-01-29

## 2022-09-18 ENCOUNTER — HEALTH MAINTENANCE LETTER (OUTPATIENT)
Age: 87
End: 2022-09-18

## 2023-05-06 ENCOUNTER — HEALTH MAINTENANCE LETTER (OUTPATIENT)
Age: 88
End: 2023-05-06

## 2023-06-29 ENCOUNTER — LAB REQUISITION (OUTPATIENT)
Dept: LAB | Facility: CLINIC | Age: 88
End: 2023-06-29
Payer: COMMERCIAL

## 2023-06-29 DIAGNOSIS — I50.33 ACUTE ON CHRONIC DIASTOLIC (CONGESTIVE) HEART FAILURE (H): ICD-10-CM

## 2023-06-30 LAB
ALBUMIN SERPL BCG-MCNC: 4.1 G/DL (ref 3.5–5.2)
ALP SERPL-CCNC: 126 U/L (ref 40–129)
ALT SERPL W P-5'-P-CCNC: 11 U/L (ref 0–70)
ANION GAP SERPL CALCULATED.3IONS-SCNC: 12 MMOL/L (ref 7–15)
AST SERPL W P-5'-P-CCNC: 22 U/L (ref 0–45)
BASOPHILS # BLD AUTO: 0.1 10E3/UL (ref 0–0.2)
BASOPHILS NFR BLD AUTO: 1 %
BILIRUB SERPL-MCNC: 0.6 MG/DL
BUN SERPL-MCNC: 12.8 MG/DL (ref 8–23)
CALCIUM SERPL-MCNC: 9.5 MG/DL (ref 8.2–9.6)
CHLORIDE SERPL-SCNC: 107 MMOL/L (ref 98–107)
CREAT SERPL-MCNC: 0.69 MG/DL (ref 0.67–1.17)
DEPRECATED HCO3 PLAS-SCNC: 22 MMOL/L (ref 22–29)
EOSINOPHIL # BLD AUTO: 0.3 10E3/UL (ref 0–0.7)
EOSINOPHIL NFR BLD AUTO: 5 %
ERYTHROCYTE [DISTWIDTH] IN BLOOD BY AUTOMATED COUNT: 13.6 % (ref 10–15)
GFR SERPL CREATININE-BSD FRML MDRD: 87 ML/MIN/1.73M2
GLUCOSE SERPL-MCNC: 79 MG/DL (ref 70–99)
HCT VFR BLD AUTO: 42.4 % (ref 40–53)
HGB BLD-MCNC: 13.8 G/DL (ref 13.3–17.7)
IMM GRANULOCYTES # BLD: 0 10E3/UL
IMM GRANULOCYTES NFR BLD: 0 %
LYMPHOCYTES # BLD AUTO: 0.9 10E3/UL (ref 0.8–5.3)
LYMPHOCYTES NFR BLD AUTO: 15 %
MCH RBC QN AUTO: 33.8 PG (ref 26.5–33)
MCHC RBC AUTO-ENTMCNC: 32.5 G/DL (ref 31.5–36.5)
MCV RBC AUTO: 104 FL (ref 78–100)
MONOCYTES # BLD AUTO: 0.7 10E3/UL (ref 0–1.3)
MONOCYTES NFR BLD AUTO: 11 %
NEUTROPHILS # BLD AUTO: 4.2 10E3/UL (ref 1.6–8.3)
NEUTROPHILS NFR BLD AUTO: 68 %
NRBC # BLD AUTO: 0 10E3/UL
NRBC BLD AUTO-RTO: 0 /100
PLATELET # BLD AUTO: 268 10E3/UL (ref 150–450)
POTASSIUM SERPL-SCNC: 4.1 MMOL/L (ref 3.4–5.3)
PROT SERPL-MCNC: 6.7 G/DL (ref 6.4–8.3)
RBC # BLD AUTO: 4.08 10E6/UL (ref 4.4–5.9)
SODIUM SERPL-SCNC: 141 MMOL/L (ref 136–145)
WBC # BLD AUTO: 6.3 10E3/UL (ref 4–11)

## 2023-06-30 PROCEDURE — 85025 COMPLETE CBC W/AUTO DIFF WBC: CPT | Mod: ORL | Performed by: REGISTERED NURSE

## 2023-06-30 PROCEDURE — 36415 COLL VENOUS BLD VENIPUNCTURE: CPT | Mod: ORL | Performed by: REGISTERED NURSE

## 2023-06-30 PROCEDURE — 80053 COMPREHEN METABOLIC PANEL: CPT | Mod: ORL | Performed by: REGISTERED NURSE

## 2023-06-30 PROCEDURE — P9604 ONE-WAY ALLOW PRORATED TRIP: HCPCS | Mod: ORL | Performed by: REGISTERED NURSE

## 2024-11-05 ENCOUNTER — LAB REQUISITION (OUTPATIENT)
Dept: LAB | Facility: CLINIC | Age: 89
End: 2024-11-05

## 2024-11-05 DIAGNOSIS — D64.9 ANEMIA, UNSPECIFIED: ICD-10-CM

## 2024-11-06 ENCOUNTER — LAB REQUISITION (OUTPATIENT)
Dept: LAB | Facility: CLINIC | Age: 89
End: 2024-11-06
Payer: COMMERCIAL

## 2024-11-06 DIAGNOSIS — D64.9 ANEMIA, UNSPECIFIED: ICD-10-CM

## 2024-11-06 LAB
ALBUMIN SERPL BCG-MCNC: 4.2 G/DL (ref 3.5–5.2)
ALBUMIN SERPL BCG-MCNC: 4.2 G/DL (ref 3.5–5.2)
ALP SERPL-CCNC: 106 U/L (ref 40–150)
ALP SERPL-CCNC: 106 U/L (ref 40–150)
ALT SERPL W P-5'-P-CCNC: 11 U/L (ref 0–70)
ALT SERPL W P-5'-P-CCNC: 11 U/L (ref 0–70)
ANION GAP SERPL CALCULATED.3IONS-SCNC: 13 MMOL/L (ref 7–15)
ANION GAP SERPL CALCULATED.3IONS-SCNC: 13 MMOL/L (ref 7–15)
AST SERPL W P-5'-P-CCNC: 29 U/L (ref 0–45)
AST SERPL W P-5'-P-CCNC: 29 U/L (ref 0–45)
BASOPHILS # BLD AUTO: 0.1 10E3/UL (ref 0–0.2)
BASOPHILS # BLD AUTO: 0.1 10E3/UL (ref 0–0.2)
BASOPHILS NFR BLD AUTO: 1 %
BASOPHILS NFR BLD AUTO: 1 %
BILIRUB SERPL-MCNC: 0.6 MG/DL
BILIRUB SERPL-MCNC: 0.6 MG/DL
BUN SERPL-MCNC: 13.9 MG/DL (ref 8–23)
BUN SERPL-MCNC: 13.9 MG/DL (ref 8–23)
CALCIUM SERPL-MCNC: 9.5 MG/DL (ref 8.8–10.4)
CALCIUM SERPL-MCNC: 9.5 MG/DL (ref 8.8–10.4)
CHLORIDE SERPL-SCNC: 104 MMOL/L (ref 98–107)
CHLORIDE SERPL-SCNC: 104 MMOL/L (ref 98–107)
CREAT SERPL-MCNC: 0.73 MG/DL (ref 0.67–1.17)
CREAT SERPL-MCNC: 0.73 MG/DL (ref 0.67–1.17)
EGFRCR SERPLBLD CKD-EPI 2021: 85 ML/MIN/1.73M2
EGFRCR SERPLBLD CKD-EPI 2021: 85 ML/MIN/1.73M2
EOSINOPHIL # BLD AUTO: 0.2 10E3/UL (ref 0–0.7)
EOSINOPHIL # BLD AUTO: 0.2 10E3/UL (ref 0–0.7)
EOSINOPHIL NFR BLD AUTO: 3 %
EOSINOPHIL NFR BLD AUTO: 3 %
ERYTHROCYTE [DISTWIDTH] IN BLOOD BY AUTOMATED COUNT: 13.9 % (ref 10–15)
ERYTHROCYTE [DISTWIDTH] IN BLOOD BY AUTOMATED COUNT: 13.9 % (ref 10–15)
FSH SERPL IRP2-ACNC: 13.5 MIU/ML (ref 1.5–12.4)
FSH SERPL IRP2-ACNC: 13.5 MIU/ML (ref 1.5–12.4)
GLUCOSE SERPL-MCNC: 88 MG/DL (ref 70–99)
GLUCOSE SERPL-MCNC: 88 MG/DL (ref 70–99)
HCO3 SERPL-SCNC: 22 MMOL/L (ref 22–29)
HCO3 SERPL-SCNC: 22 MMOL/L (ref 22–29)
HCT VFR BLD AUTO: 46.4 % (ref 40–53)
HCT VFR BLD AUTO: 46.4 % (ref 40–53)
HGB BLD-MCNC: 14.7 G/DL (ref 13.3–17.7)
HGB BLD-MCNC: 14.7 G/DL (ref 13.3–17.7)
HOLD SPECIMEN: NORMAL
HOLD SPECIMEN: NORMAL
IMM GRANULOCYTES # BLD: 0 10E3/UL
IMM GRANULOCYTES # BLD: 0 10E3/UL
IMM GRANULOCYTES NFR BLD: 0 %
IMM GRANULOCYTES NFR BLD: 0 %
LYMPHOCYTES # BLD AUTO: 1.1 10E3/UL (ref 0.8–5.3)
LYMPHOCYTES # BLD AUTO: 1.1 10E3/UL (ref 0.8–5.3)
LYMPHOCYTES NFR BLD AUTO: 16 %
LYMPHOCYTES NFR BLD AUTO: 16 %
MCH RBC QN AUTO: 33.2 PG (ref 26.5–33)
MCH RBC QN AUTO: 33.2 PG (ref 26.5–33)
MCHC RBC AUTO-ENTMCNC: 31.7 G/DL (ref 31.5–36.5)
MCHC RBC AUTO-ENTMCNC: 31.7 G/DL (ref 31.5–36.5)
MCV RBC AUTO: 105 FL (ref 78–100)
MCV RBC AUTO: 105 FL (ref 78–100)
MONOCYTES # BLD AUTO: 0.6 10E3/UL (ref 0–1.3)
MONOCYTES # BLD AUTO: 0.6 10E3/UL (ref 0–1.3)
MONOCYTES NFR BLD AUTO: 9 %
MONOCYTES NFR BLD AUTO: 9 %
NEUTROPHILS # BLD AUTO: 4.9 10E3/UL (ref 1.6–8.3)
NEUTROPHILS # BLD AUTO: 4.9 10E3/UL (ref 1.6–8.3)
NEUTROPHILS NFR BLD AUTO: 71 %
NEUTROPHILS NFR BLD AUTO: 71 %
NRBC # BLD AUTO: 0 10E3/UL
NRBC # BLD AUTO: 0 10E3/UL
NRBC BLD AUTO-RTO: 0 /100
NRBC BLD AUTO-RTO: 0 /100
PLATELET # BLD AUTO: 260 10E3/UL (ref 150–450)
PLATELET # BLD AUTO: 260 10E3/UL (ref 150–450)
POTASSIUM SERPL-SCNC: 4.2 MMOL/L (ref 3.4–5.3)
POTASSIUM SERPL-SCNC: 4.2 MMOL/L (ref 3.4–5.3)
PROT SERPL-MCNC: 7.1 G/DL (ref 6.4–8.3)
PROT SERPL-MCNC: 7.1 G/DL (ref 6.4–8.3)
RBC # BLD AUTO: 4.43 10E6/UL (ref 4.4–5.9)
RBC # BLD AUTO: 4.43 10E6/UL (ref 4.4–5.9)
SODIUM SERPL-SCNC: 139 MMOL/L (ref 135–145)
SODIUM SERPL-SCNC: 139 MMOL/L (ref 135–145)
TSH SERPL DL<=0.005 MIU/L-ACNC: 1.91 UIU/ML (ref 0.3–4.2)
TSH SERPL DL<=0.005 MIU/L-ACNC: 1.91 UIU/ML (ref 0.3–4.2)
VIT D+METAB SERPL-MCNC: 21 NG/ML (ref 20–50)
VIT D+METAB SERPL-MCNC: 21 NG/ML (ref 20–50)
WBC # BLD AUTO: 6.9 10E3/UL (ref 4–11)
WBC # BLD AUTO: 6.9 10E3/UL (ref 4–11)

## 2024-11-06 PROCEDURE — 82306 VITAMIN D 25 HYDROXY: CPT | Mod: ORL | Performed by: REGISTERED NURSE

## 2024-11-06 PROCEDURE — 84443 ASSAY THYROID STIM HORMONE: CPT | Mod: ORL | Performed by: REGISTERED NURSE

## 2024-11-06 PROCEDURE — 83001 ASSAY OF GONADOTROPIN (FSH): CPT | Mod: ORL | Performed by: REGISTERED NURSE

## 2024-11-06 PROCEDURE — 85025 COMPLETE CBC W/AUTO DIFF WBC: CPT | Mod: ORL | Performed by: REGISTERED NURSE

## 2024-11-06 PROCEDURE — P9604 ONE-WAY ALLOW PRORATED TRIP: HCPCS | Mod: ORL | Performed by: REGISTERED NURSE

## 2024-11-06 PROCEDURE — 36415 COLL VENOUS BLD VENIPUNCTURE: CPT | Mod: ORL | Performed by: REGISTERED NURSE

## 2024-11-06 PROCEDURE — 80053 COMPREHEN METABOLIC PANEL: CPT | Mod: ORL | Performed by: REGISTERED NURSE
